# Patient Record
Sex: MALE | Race: WHITE | NOT HISPANIC OR LATINO | Employment: OTHER | URBAN - METROPOLITAN AREA
[De-identification: names, ages, dates, MRNs, and addresses within clinical notes are randomized per-mention and may not be internally consistent; named-entity substitution may affect disease eponyms.]

---

## 2019-05-17 ENCOUNTER — HISTORICAL (OUTPATIENT)
Dept: ADMINISTRATIVE | Facility: HOSPITAL | Age: 44
End: 2019-05-17

## 2020-05-15 ENCOUNTER — HISTORICAL (OUTPATIENT)
Dept: ADMINISTRATIVE | Facility: HOSPITAL | Age: 45
End: 2020-05-15

## 2020-05-19 ENCOUNTER — HISTORICAL (OUTPATIENT)
Dept: ADMINISTRATIVE | Facility: HOSPITAL | Age: 45
End: 2020-05-19

## 2020-06-03 ENCOUNTER — HISTORICAL (OUTPATIENT)
Dept: ADMINISTRATIVE | Facility: HOSPITAL | Age: 45
End: 2020-06-03

## 2020-06-08 ENCOUNTER — HISTORICAL (OUTPATIENT)
Dept: RADIOLOGY | Facility: HOSPITAL | Age: 45
End: 2020-06-08

## 2020-12-18 ENCOUNTER — HISTORICAL (OUTPATIENT)
Dept: ADMINISTRATIVE | Facility: HOSPITAL | Age: 45
End: 2020-12-18

## 2021-05-18 ENCOUNTER — HISTORICAL (OUTPATIENT)
Dept: ADMINISTRATIVE | Facility: HOSPITAL | Age: 46
End: 2021-05-18

## 2021-11-12 ENCOUNTER — HISTORICAL (OUTPATIENT)
Dept: ADMINISTRATIVE | Facility: HOSPITAL | Age: 46
End: 2021-11-12

## 2022-04-07 ENCOUNTER — HISTORICAL (OUTPATIENT)
Dept: ADMINISTRATIVE | Facility: HOSPITAL | Age: 47
End: 2022-04-07

## 2022-04-24 VITALS
BODY MASS INDEX: 30.01 KG/M2 | WEIGHT: 221.56 LBS | HEIGHT: 72 IN | SYSTOLIC BLOOD PRESSURE: 110 MMHG | OXYGEN SATURATION: 95 % | DIASTOLIC BLOOD PRESSURE: 78 MMHG

## 2022-05-16 ENCOUNTER — LAB VISIT (OUTPATIENT)
Dept: LAB | Facility: HOSPITAL | Age: 47
End: 2022-05-16
Attending: INTERNAL MEDICINE
Payer: COMMERCIAL

## 2022-05-16 DIAGNOSIS — K21.9 GASTROESOPHAGEAL REFLUX DISEASE, UNSPECIFIED WHETHER ESOPHAGITIS PRESENT: ICD-10-CM

## 2022-05-16 DIAGNOSIS — R53.83 FATIGUE, UNSPECIFIED TYPE: ICD-10-CM

## 2022-05-16 DIAGNOSIS — Z00.00 WELL ADULT EXAM: ICD-10-CM

## 2022-05-16 DIAGNOSIS — Z80.42 FAMILY HISTORY OF MALIGNANT NEOPLASM OF PROSTATE: Primary | ICD-10-CM

## 2022-05-16 DIAGNOSIS — R79.89 HYPOURICEMIA: ICD-10-CM

## 2022-05-16 LAB
ALBUMIN SERPL-MCNC: 4.3 GM/DL (ref 3.5–5)
ALBUMIN/GLOB SERPL: 1.4 RATIO (ref 1.1–2)
ALP SERPL-CCNC: 70 UNIT/L (ref 40–150)
ALT SERPL-CCNC: 24 UNIT/L (ref 0–55)
AST SERPL-CCNC: 19 UNIT/L (ref 5–34)
BASOPHILS # BLD AUTO: 0.04 X10(3)/MCL (ref 0–0.2)
BASOPHILS NFR BLD AUTO: 0.7 %
BILIRUBIN DIRECT+TOT PNL SERPL-MCNC: 0.7 MG/DL
BUN SERPL-MCNC: 16.5 MG/DL (ref 8.9–20.6)
CALCIUM SERPL-MCNC: 9.5 MG/DL (ref 8.4–10.2)
CHLORIDE SERPL-SCNC: 102 MMOL/L (ref 98–107)
CHOLEST SERPL-MCNC: 190 MG/DL
CHOLEST/HDLC SERPL: 4 {RATIO} (ref 0–5)
CO2 SERPL-SCNC: 28 MMOL/L (ref 22–29)
CREAT SERPL-MCNC: 1.18 MG/DL (ref 0.73–1.18)
EOSINOPHIL # BLD AUTO: 0.16 X10(3)/MCL (ref 0–0.9)
EOSINOPHIL NFR BLD AUTO: 2.7 %
ERYTHROCYTE [DISTWIDTH] IN BLOOD BY AUTOMATED COUNT: 12.9 % (ref 11.5–17)
GLOBULIN SER-MCNC: 3 GM/DL (ref 2.4–3.5)
GLUCOSE SERPL-MCNC: 82 MG/DL (ref 74–100)
HCT VFR BLD AUTO: 49.1 % (ref 42–52)
HDLC SERPL-MCNC: 54 MG/DL (ref 35–60)
HGB BLD-MCNC: 16.5 GM/DL (ref 14–18)
IMM GRANULOCYTES # BLD AUTO: 0.03 X10(3)/MCL (ref 0–0.02)
IMM GRANULOCYTES NFR BLD AUTO: 0.5 % (ref 0–0.43)
LDLC SERPL CALC-MCNC: 113 MG/DL (ref 50–140)
LYMPHOCYTES # BLD AUTO: 2 X10(3)/MCL (ref 0.6–4.6)
LYMPHOCYTES NFR BLD AUTO: 33.2 %
MCH RBC QN AUTO: 29.2 PG (ref 27–31)
MCHC RBC AUTO-ENTMCNC: 33.6 MG/DL (ref 33–36)
MCV RBC AUTO: 86.7 FL (ref 80–94)
MONOCYTES # BLD AUTO: 0.43 X10(3)/MCL (ref 0.1–1.3)
MONOCYTES NFR BLD AUTO: 7.1 %
NEUTROPHILS # BLD AUTO: 3.4 X10(3)/MCL (ref 2.1–9.2)
NEUTROPHILS NFR BLD AUTO: 55.8 %
NRBC BLD AUTO-RTO: 0 %
PLATELET # BLD AUTO: 243 X10(3)/MCL (ref 130–400)
PMV BLD AUTO: 10.1 FL (ref 9.4–12.4)
POTASSIUM SERPL-SCNC: 4.7 MMOL/L (ref 3.5–5.1)
PROT SERPL-MCNC: 7.3 GM/DL (ref 6.4–8.3)
PSA SERPL-MCNC: 0.75 NG/ML
RBC # BLD AUTO: 5.66 X10(6)/MCL (ref 4.7–6.1)
SODIUM SERPL-SCNC: 137 MMOL/L (ref 136–145)
TESTOST SERPL-MCNC: 259.43 NG/DL (ref 240.24–870.68)
TRIGL SERPL-MCNC: 114 MG/DL (ref 34–140)
VLDLC SERPL CALC-MCNC: 23 MG/DL
WBC # SPEC AUTO: 6 X10(3)/MCL (ref 4.5–11.5)

## 2022-05-16 PROCEDURE — 80061 LIPID PANEL: CPT

## 2022-05-16 PROCEDURE — 84403 ASSAY OF TOTAL TESTOSTERONE: CPT

## 2022-05-16 PROCEDURE — 84153 ASSAY OF PSA TOTAL: CPT

## 2022-05-16 PROCEDURE — 36415 COLL VENOUS BLD VENIPUNCTURE: CPT

## 2022-05-16 PROCEDURE — 80053 COMPREHEN METABOLIC PANEL: CPT

## 2022-05-16 PROCEDURE — 85025 COMPLETE CBC W/AUTO DIFF WBC: CPT

## 2022-05-17 ENCOUNTER — OFFICE VISIT (OUTPATIENT)
Dept: INTERNAL MEDICINE | Facility: CLINIC | Age: 47
End: 2022-05-17
Payer: COMMERCIAL

## 2022-05-17 VITALS
HEIGHT: 73 IN | DIASTOLIC BLOOD PRESSURE: 64 MMHG | RESPIRATION RATE: 18 BRPM | BODY MASS INDEX: 29.29 KG/M2 | OXYGEN SATURATION: 96 % | SYSTOLIC BLOOD PRESSURE: 120 MMHG | WEIGHT: 221 LBS | TEMPERATURE: 98 F | HEART RATE: 77 BPM

## 2022-05-17 DIAGNOSIS — K21.9 GASTROESOPHAGEAL REFLUX DISEASE, UNSPECIFIED WHETHER ESOPHAGITIS PRESENT: ICD-10-CM

## 2022-05-17 DIAGNOSIS — R79.89 DECREASED TESTOSTERONE LEVEL: ICD-10-CM

## 2022-05-17 DIAGNOSIS — Z00.00 WELLNESS EXAMINATION: Primary | ICD-10-CM

## 2022-05-17 PROBLEM — Z80.42 FAMILY HISTORY OF PROSTATE CANCER: Status: ACTIVE | Noted: 2022-05-17

## 2022-05-17 PROCEDURE — 1159F MED LIST DOCD IN RCRD: CPT | Mod: CPTII,,, | Performed by: INTERNAL MEDICINE

## 2022-05-17 PROCEDURE — 99396 PR PREVENTIVE VISIT,EST,40-64: ICD-10-PCS | Mod: ,,, | Performed by: INTERNAL MEDICINE

## 2022-05-17 PROCEDURE — 3008F PR BODY MASS INDEX (BMI) DOCUMENTED: ICD-10-PCS | Mod: CPTII,,, | Performed by: INTERNAL MEDICINE

## 2022-05-17 PROCEDURE — 99396 PREV VISIT EST AGE 40-64: CPT | Mod: ,,, | Performed by: INTERNAL MEDICINE

## 2022-05-17 PROCEDURE — 3078F DIAST BP <80 MM HG: CPT | Mod: CPTII,,, | Performed by: INTERNAL MEDICINE

## 2022-05-17 PROCEDURE — 3074F PR MOST RECENT SYSTOLIC BLOOD PRESSURE < 130 MM HG: ICD-10-PCS | Mod: CPTII,,, | Performed by: INTERNAL MEDICINE

## 2022-05-17 PROCEDURE — 3078F PR MOST RECENT DIASTOLIC BLOOD PRESSURE < 80 MM HG: ICD-10-PCS | Mod: CPTII,,, | Performed by: INTERNAL MEDICINE

## 2022-05-17 PROCEDURE — 3008F BODY MASS INDEX DOCD: CPT | Mod: CPTII,,, | Performed by: INTERNAL MEDICINE

## 2022-05-17 PROCEDURE — 3074F SYST BP LT 130 MM HG: CPT | Mod: CPTII,,, | Performed by: INTERNAL MEDICINE

## 2022-05-17 PROCEDURE — 1159F PR MEDICATION LIST DOCUMENTED IN MEDICAL RECORD: ICD-10-PCS | Mod: CPTII,,, | Performed by: INTERNAL MEDICINE

## 2022-05-17 RX ORDER — TESTOSTERONE CYPIONATE 1000 MG/10ML
100 INJECTION, SOLUTION INTRAMUSCULAR
Qty: 10 ML | Refills: 2 | Status: SHIPPED | OUTPATIENT
Start: 2022-05-17 | End: 2022-05-17 | Stop reason: SDUPTHER

## 2022-05-17 RX ORDER — TESTOSTERONE CYPIONATE 1000 MG/10ML
INJECTION, SOLUTION INTRAMUSCULAR
COMMUNITY
Start: 2022-02-06 | End: 2022-05-17 | Stop reason: SDUPTHER

## 2022-05-17 RX ORDER — SILDENAFIL 100 MG/1
1 TABLET, FILM COATED ORAL
COMMUNITY
Start: 2022-02-06 | End: 2022-05-17 | Stop reason: SDUPTHER

## 2022-05-17 RX ORDER — SILDENAFIL 100 MG/1
100 TABLET, FILM COATED ORAL
Qty: 9 TABLET | Refills: 11 | Status: SHIPPED | OUTPATIENT
Start: 2022-05-17 | End: 2022-08-08

## 2022-05-17 RX ORDER — TESTOSTERONE CYPIONATE 1000 MG/10ML
100 INJECTION, SOLUTION INTRAMUSCULAR
Qty: 10 ML | Refills: 2 | Status: SHIPPED | OUTPATIENT
Start: 2022-05-17 | End: 2022-06-29

## 2022-05-17 NOTE — PROGRESS NOTES
Subjective:       Patient ID: Larry Eli is a 46 y.o. male.    Chief Complaint: Follow-up (F/u mmp)    The patient is a 46-year-old man in for wellness check.  He feels well overall.  No new complaints.  He admits he is not exercising but is trying to improve his diet.    Follow-up      Review of Systems      Objective:      Physical Exam  Vitals reviewed.   Constitutional:       Appearance: Normal appearance.   HENT:      Head: Normocephalic and atraumatic.      Right Ear: Tympanic membrane normal.      Left Ear: Tympanic membrane normal.      Mouth/Throat:      Pharynx: Oropharynx is clear.   Eyes:      Pupils: Pupils are equal, round, and reactive to light.   Neck:      Vascular: No carotid bruit.   Cardiovascular:      Rate and Rhythm: Normal rate and regular rhythm.      Pulses: Normal pulses.      Heart sounds: Normal heart sounds.   Pulmonary:      Effort: Pulmonary effort is normal.      Breath sounds: Normal breath sounds.   Abdominal:      General: Abdomen is flat.      Palpations: Abdomen is soft. There is no mass.      Tenderness: There is no abdominal tenderness. There is no guarding.   Genitourinary:     Comments: Rectal exam reveals normal tone.  Prostate medium size with some asymmetry with the right lobe being a bit larger than the left.  No nodularity.  No masses.  Heme-negative stool.  Musculoskeletal:         General: No swelling.      Cervical back: Neck supple.   Lymphadenopathy:      Cervical: No cervical adenopathy.   Skin:     General: Skin is warm and dry.   Neurological:      General: No focal deficit present.      Mental Status: He is alert and oriented to person, place, and time.       lab work reviewed.  Numbers were fine including PSA  Assessment:       Problem List Items Addressed This Visit        Endocrine    Decreased testosterone level    Relevant Orders    CBC Auto Differential    Comprehensive Metabolic Panel    Lipid Panel    Testosterone       GI    Gastroesophageal  reflux disease    Relevant Orders    CBC Auto Differential    Comprehensive Metabolic Panel    Lipid Panel    Testosterone       Other    Wellness examination - Primary    Relevant Orders    CBC Auto Differential    Comprehensive Metabolic Panel    Lipid Panel    Testosterone          Plan:     1. Wellness    2. Strong family history of prostate cancer.    3. 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 History of GERD.  Stable    5. Obesity.    Continue same meds TLC etc..  Encourage weight reduction.  Encourage routine exercise.  Follow-up 6 months with CBC CMP lipid testosterone prior

## 2022-05-23 ENCOUNTER — PATIENT MESSAGE (OUTPATIENT)
Dept: ADMINISTRATIVE | Facility: HOSPITAL | Age: 47
End: 2022-05-23
Payer: COMMERCIAL

## 2022-08-22 PROBLEM — Z00.00 WELLNESS EXAMINATION: Status: RESOLVED | Noted: 2022-05-17 | Resolved: 2022-08-22

## 2022-08-26 ENCOUNTER — PATIENT MESSAGE (OUTPATIENT)
Dept: ADMINISTRATIVE | Facility: HOSPITAL | Age: 47
End: 2022-08-26
Payer: COMMERCIAL

## 2022-08-26 DIAGNOSIS — Z12.11 SCREENING FOR COLON CANCER: ICD-10-CM

## 2022-10-03 ENCOUNTER — PATIENT MESSAGE (OUTPATIENT)
Dept: ADMINISTRATIVE | Facility: HOSPITAL | Age: 47
End: 2022-10-03
Payer: COMMERCIAL

## 2022-11-10 ENCOUNTER — TELEPHONE (OUTPATIENT)
Dept: INTERNAL MEDICINE | Facility: CLINIC | Age: 47
End: 2022-11-10
Payer: COMMERCIAL

## 2022-11-10 NOTE — TELEPHONE ENCOUNTER
----- Message from Jennifer Betts LPN sent at 11/9/2022  4:20 PM CST -----  Regarding: leelee Nguyen 11/17 @2:00  Fasting labs needed.

## 2022-11-15 ENCOUNTER — LAB VISIT (OUTPATIENT)
Dept: LAB | Facility: HOSPITAL | Age: 47
End: 2022-11-15
Attending: INTERNAL MEDICINE
Payer: COMMERCIAL

## 2022-11-15 DIAGNOSIS — K21.9 GASTROESOPHAGEAL REFLUX DISEASE, UNSPECIFIED WHETHER ESOPHAGITIS PRESENT: ICD-10-CM

## 2022-11-15 DIAGNOSIS — R79.89 DECREASED TESTOSTERONE LEVEL: ICD-10-CM

## 2022-11-15 DIAGNOSIS — Z00.00 WELLNESS EXAMINATION: ICD-10-CM

## 2022-11-15 LAB
ALBUMIN SERPL-MCNC: 4.3 GM/DL (ref 3.5–5)
ALBUMIN/GLOB SERPL: 1.4 RATIO (ref 1.1–2)
ALP SERPL-CCNC: 74 UNIT/L (ref 40–150)
ALT SERPL-CCNC: 25 UNIT/L (ref 0–55)
AST SERPL-CCNC: 21 UNIT/L (ref 5–34)
BASOPHILS # BLD AUTO: 0.04 X10(3)/MCL (ref 0–0.2)
BASOPHILS NFR BLD AUTO: 0.6 %
BILIRUBIN DIRECT+TOT PNL SERPL-MCNC: 0.7 MG/DL
BUN SERPL-MCNC: 14.7 MG/DL (ref 8.9–20.6)
CALCIUM SERPL-MCNC: 9.7 MG/DL (ref 8.4–10.2)
CHLORIDE SERPL-SCNC: 101 MMOL/L (ref 98–107)
CHOLEST SERPL-MCNC: 191 MG/DL
CHOLEST/HDLC SERPL: 4 {RATIO} (ref 0–5)
CO2 SERPL-SCNC: 27 MMOL/L (ref 22–29)
CREAT SERPL-MCNC: 1.01 MG/DL (ref 0.73–1.18)
EOSINOPHIL # BLD AUTO: 0.19 X10(3)/MCL (ref 0–0.9)
EOSINOPHIL NFR BLD AUTO: 3 %
ERYTHROCYTE [DISTWIDTH] IN BLOOD BY AUTOMATED COUNT: 12.6 % (ref 11.5–17)
GFR SERPLBLD CREATININE-BSD FMLA CKD-EPI: >60 MLS/MIN/1.73/M2
GLOBULIN SER-MCNC: 3 GM/DL (ref 2.4–3.5)
GLUCOSE SERPL-MCNC: 89 MG/DL (ref 74–100)
HCT VFR BLD AUTO: 49.3 % (ref 42–52)
HDLC SERPL-MCNC: 53 MG/DL (ref 35–60)
HGB BLD-MCNC: 16.5 GM/DL (ref 14–18)
IMM GRANULOCYTES # BLD AUTO: 0.01 X10(3)/MCL (ref 0–0.04)
IMM GRANULOCYTES NFR BLD AUTO: 0.2 %
LDLC SERPL CALC-MCNC: 120 MG/DL (ref 50–140)
LYMPHOCYTES # BLD AUTO: 2.14 X10(3)/MCL (ref 0.6–4.6)
LYMPHOCYTES NFR BLD AUTO: 33.3 %
MCH RBC QN AUTO: 29.5 PG (ref 27–31)
MCHC RBC AUTO-ENTMCNC: 33.5 MG/DL (ref 33–36)
MCV RBC AUTO: 88.2 FL (ref 80–94)
MONOCYTES # BLD AUTO: 0.55 X10(3)/MCL (ref 0.1–1.3)
MONOCYTES NFR BLD AUTO: 8.6 %
NEUTROPHILS # BLD AUTO: 3.5 X10(3)/MCL (ref 2.1–9.2)
NEUTROPHILS NFR BLD AUTO: 54.3 %
NRBC BLD AUTO-RTO: 0 %
PLATELET # BLD AUTO: 246 X10(3)/MCL (ref 130–400)
PMV BLD AUTO: 10 FL (ref 7.4–10.4)
POTASSIUM SERPL-SCNC: 4.4 MMOL/L (ref 3.5–5.1)
PROT SERPL-MCNC: 7.3 GM/DL (ref 6.4–8.3)
RBC # BLD AUTO: 5.59 X10(6)/MCL (ref 4.7–6.1)
SODIUM SERPL-SCNC: 135 MMOL/L (ref 136–145)
TESTOST SERPL-MCNC: 293.07 NG/DL (ref 240.24–870.68)
TRIGL SERPL-MCNC: 92 MG/DL (ref 34–140)
VLDLC SERPL CALC-MCNC: 18 MG/DL
WBC # SPEC AUTO: 6.4 X10(3)/MCL (ref 4.5–11.5)

## 2022-11-15 PROCEDURE — 84403 ASSAY OF TOTAL TESTOSTERONE: CPT

## 2022-11-15 PROCEDURE — 80053 COMPREHEN METABOLIC PANEL: CPT

## 2022-11-15 PROCEDURE — 36415 COLL VENOUS BLD VENIPUNCTURE: CPT

## 2022-11-15 PROCEDURE — 80061 LIPID PANEL: CPT

## 2022-11-15 PROCEDURE — 85025 COMPLETE CBC W/AUTO DIFF WBC: CPT

## 2022-11-17 ENCOUNTER — OFFICE VISIT (OUTPATIENT)
Dept: INTERNAL MEDICINE | Facility: CLINIC | Age: 47
End: 2022-11-17
Payer: COMMERCIAL

## 2022-11-17 VITALS
RESPIRATION RATE: 18 BRPM | SYSTOLIC BLOOD PRESSURE: 128 MMHG | HEIGHT: 73 IN | HEART RATE: 80 BPM | WEIGHT: 225 LBS | DIASTOLIC BLOOD PRESSURE: 70 MMHG | BODY MASS INDEX: 29.82 KG/M2 | TEMPERATURE: 98 F | OXYGEN SATURATION: 97 %

## 2022-11-17 DIAGNOSIS — Z80.42 FAMILY HISTORY OF PROSTATE CANCER: ICD-10-CM

## 2022-11-17 DIAGNOSIS — Z00.00 WELLNESS EXAMINATION: ICD-10-CM

## 2022-11-17 DIAGNOSIS — K21.9 GASTROESOPHAGEAL REFLUX DISEASE, UNSPECIFIED WHETHER ESOPHAGITIS PRESENT: ICD-10-CM

## 2022-11-17 DIAGNOSIS — R79.89 DECREASED TESTOSTERONE LEVEL: Primary | ICD-10-CM

## 2022-11-17 PROCEDURE — 3078F DIAST BP <80 MM HG: CPT | Mod: CPTII,,, | Performed by: INTERNAL MEDICINE

## 2022-11-17 PROCEDURE — 99213 PR OFFICE/OUTPT VISIT, EST, LEVL III, 20-29 MIN: ICD-10-PCS | Mod: ,,, | Performed by: INTERNAL MEDICINE

## 2022-11-17 PROCEDURE — 1159F PR MEDICATION LIST DOCUMENTED IN MEDICAL RECORD: ICD-10-PCS | Mod: CPTII,,, | Performed by: INTERNAL MEDICINE

## 2022-11-17 PROCEDURE — 3008F PR BODY MASS INDEX (BMI) DOCUMENTED: ICD-10-PCS | Mod: CPTII,,, | Performed by: INTERNAL MEDICINE

## 2022-11-17 PROCEDURE — 1159F MED LIST DOCD IN RCRD: CPT | Mod: CPTII,,, | Performed by: INTERNAL MEDICINE

## 2022-11-17 PROCEDURE — 3074F SYST BP LT 130 MM HG: CPT | Mod: CPTII,,, | Performed by: INTERNAL MEDICINE

## 2022-11-17 PROCEDURE — 99213 OFFICE O/P EST LOW 20 MIN: CPT | Mod: ,,, | Performed by: INTERNAL MEDICINE

## 2022-11-17 PROCEDURE — 3074F PR MOST RECENT SYSTOLIC BLOOD PRESSURE < 130 MM HG: ICD-10-PCS | Mod: CPTII,,, | Performed by: INTERNAL MEDICINE

## 2022-11-17 PROCEDURE — 3008F BODY MASS INDEX DOCD: CPT | Mod: CPTII,,, | Performed by: INTERNAL MEDICINE

## 2022-11-17 PROCEDURE — 3078F PR MOST RECENT DIASTOLIC BLOOD PRESSURE < 80 MM HG: ICD-10-PCS | Mod: CPTII,,, | Performed by: INTERNAL MEDICINE

## 2022-11-17 RX ORDER — SILDENAFIL 100 MG/1
100 TABLET, FILM COATED ORAL
Qty: 10 TABLET | Refills: 11 | Status: SHIPPED | OUTPATIENT
Start: 2022-11-17 | End: 2023-11-17

## 2022-11-17 NOTE — PROGRESS NOTES
"Subjective:       Patient ID: Larry Eli is a 46 y.o. male.    Chief Complaint: Follow-up (6 mo-discuss labs 11/15)      The patient is a 46-year-old man in for follow-up of multiple issues.  He feels fine overall.  No side effects from meds.  His testosterone level was done on day 13 of his prior injection.    Follow-up    Review of Systems     Current Outpatient Medications on File Prior to Visit   Medication Sig Dispense Refill    sildenafiL (VIAGRA) 100 MG tablet TRY 1 HALF TABLET BY MOUTH DAILY FOR FIRST 2 DOSES, THEN 1 TABLET DAILY AS NEEDED FOR ERECTILE DYSFUCTION 10 tablet 11    testosterone cypionate (DEPOTESTOTERONE CYPIONATE) 100 mg/mL injection INJECT 1ML INTO THE MUSCLE EVERY 2 WEEKS 10 mL 1     No current facility-administered medications on file prior to visit.     Objective:      /70 (BP Location: Right arm, Patient Position: Sitting)   Pulse 80   Temp 98.3 °F (36.8 °C)   Resp 18   Ht 6' 0.99" (1.854 m)   Wt 102.1 kg (225 lb)   SpO2 97%   BMI 29.69 kg/m²     Physical Exam  Vitals reviewed.   Constitutional:       Appearance: Normal appearance.   HENT:      Head: Normocephalic and atraumatic.      Mouth/Throat:      Pharynx: Oropharynx is clear.   Eyes:      Pupils: Pupils are equal, round, and reactive to light.   Cardiovascular:      Rate and Rhythm: Normal rate and regular rhythm.      Pulses: Normal pulses.      Heart sounds: Normal heart sounds.   Pulmonary:      Breath sounds: Normal breath sounds.   Abdominal:      General: Abdomen is flat.      Palpations: Abdomen is soft.   Musculoskeletal:      Cervical back: Neck supple.   Skin:     General: Skin is warm and dry.   Neurological:      Mental Status: He is alert.     Lab work reviewed  Assessment:       1. Decreased testosterone level    2. Gastroesophageal reflux disease, unspecified whether esophagitis present    3. Family history of prostate cancer    4. Wellness examination        1. Low T syndrome.  On replacement " hormone with good results.  No obvious toxicity    2. GERD.  Stable     3. Family history of prostate CA.  Plan:           Continue same meds.  New prescription sent to pharmacy.  Follow-up with me 6 months annual checkup with CBC CMP lipid TSH PSA testosterone level prior

## 2023-01-30 ENCOUNTER — PATIENT MESSAGE (OUTPATIENT)
Dept: ADMINISTRATIVE | Facility: HOSPITAL | Age: 48
End: 2023-01-30
Payer: COMMERCIAL

## 2023-05-01 ENCOUNTER — PATIENT MESSAGE (OUTPATIENT)
Dept: ADMINISTRATIVE | Facility: HOSPITAL | Age: 48
End: 2023-05-01
Payer: COMMERCIAL

## 2023-05-11 ENCOUNTER — TELEPHONE (OUTPATIENT)
Dept: INTERNAL MEDICINE | Facility: CLINIC | Age: 48
End: 2023-05-11
Payer: COMMERCIAL

## 2023-05-11 NOTE — TELEPHONE ENCOUNTER
----- Message from Sarah Adam MA sent at 5/11/2023  9:44 AM CDT -----  Regarding: PV 05/17/23  @2  Fasting labs needed

## 2023-05-12 ENCOUNTER — LAB VISIT (OUTPATIENT)
Dept: LAB | Facility: HOSPITAL | Age: 48
End: 2023-05-12
Attending: INTERNAL MEDICINE
Payer: COMMERCIAL

## 2023-05-12 DIAGNOSIS — R79.89 DECREASED TESTOSTERONE LEVEL: ICD-10-CM

## 2023-05-12 DIAGNOSIS — Z80.42 FAMILY HISTORY OF PROSTATE CANCER: ICD-10-CM

## 2023-05-12 DIAGNOSIS — Z00.00 WELLNESS EXAMINATION: ICD-10-CM

## 2023-05-12 DIAGNOSIS — K21.9 GASTROESOPHAGEAL REFLUX DISEASE, UNSPECIFIED WHETHER ESOPHAGITIS PRESENT: ICD-10-CM

## 2023-05-12 LAB
ALBUMIN SERPL-MCNC: 4.1 G/DL (ref 3.5–5)
ALBUMIN/GLOB SERPL: 1.4 RATIO (ref 1.1–2)
ALP SERPL-CCNC: 67 UNIT/L (ref 40–150)
ALT SERPL-CCNC: 22 UNIT/L (ref 0–55)
AST SERPL-CCNC: 19 UNIT/L (ref 5–34)
BASOPHILS # BLD AUTO: 0.03 X10(3)/MCL
BASOPHILS NFR BLD AUTO: 0.5 %
BILIRUBIN DIRECT+TOT PNL SERPL-MCNC: 0.6 MG/DL
BUN SERPL-MCNC: 14.1 MG/DL (ref 8.9–20.6)
CALCIUM SERPL-MCNC: 9.3 MG/DL (ref 8.4–10.2)
CHLORIDE SERPL-SCNC: 104 MMOL/L (ref 98–107)
CHOLEST SERPL-MCNC: 184 MG/DL
CHOLEST/HDLC SERPL: 4 {RATIO} (ref 0–5)
CO2 SERPL-SCNC: 27 MMOL/L (ref 22–29)
CREAT SERPL-MCNC: 1.16 MG/DL (ref 0.73–1.18)
EOSINOPHIL # BLD AUTO: 0.16 X10(3)/MCL (ref 0–0.9)
EOSINOPHIL NFR BLD AUTO: 2.5 %
ERYTHROCYTE [DISTWIDTH] IN BLOOD BY AUTOMATED COUNT: 12.4 % (ref 11.5–17)
EST. AVERAGE GLUCOSE BLD GHB EST-MCNC: 102.5 MG/DL
GFR SERPLBLD CREATININE-BSD FMLA CKD-EPI: >60 MLS/MIN/1.73/M2
GLOBULIN SER-MCNC: 2.9 GM/DL (ref 2.4–3.5)
GLUCOSE SERPL-MCNC: 94 MG/DL (ref 74–100)
HBA1C MFR BLD: 5.2 %
HCT VFR BLD AUTO: 48.9 % (ref 42–52)
HDLC SERPL-MCNC: 43 MG/DL (ref 35–60)
HGB BLD-MCNC: 16.4 G/DL (ref 14–18)
IMM GRANULOCYTES # BLD AUTO: 0.03 X10(3)/MCL (ref 0–0.04)
IMM GRANULOCYTES NFR BLD AUTO: 0.5 %
LDLC SERPL CALC-MCNC: 125 MG/DL (ref 50–140)
LYMPHOCYTES # BLD AUTO: 2.07 X10(3)/MCL (ref 0.6–4.6)
LYMPHOCYTES NFR BLD AUTO: 32.5 %
MCH RBC QN AUTO: 29.4 PG (ref 27–31)
MCHC RBC AUTO-ENTMCNC: 33.5 G/DL (ref 33–36)
MCV RBC AUTO: 87.8 FL (ref 80–94)
MONOCYTES # BLD AUTO: 0.49 X10(3)/MCL (ref 0.1–1.3)
MONOCYTES NFR BLD AUTO: 7.7 %
NEUTROPHILS # BLD AUTO: 3.59 X10(3)/MCL (ref 2.1–9.2)
NEUTROPHILS NFR BLD AUTO: 56.3 %
NRBC BLD AUTO-RTO: 0 %
PLATELET # BLD AUTO: 231 X10(3)/MCL (ref 130–400)
PMV BLD AUTO: 9.9 FL (ref 7.4–10.4)
POTASSIUM SERPL-SCNC: 4.5 MMOL/L (ref 3.5–5.1)
PROT SERPL-MCNC: 7 GM/DL (ref 6.4–8.3)
PSA SERPL-MCNC: 0.69 NG/ML
RBC # BLD AUTO: 5.57 X10(6)/MCL (ref 4.7–6.1)
SODIUM SERPL-SCNC: 138 MMOL/L (ref 136–145)
TESTOST SERPL-MCNC: 375.65 NG/DL (ref 240.24–870.68)
TRIGL SERPL-MCNC: 82 MG/DL (ref 34–140)
TSH SERPL-ACNC: 1.3 UIU/ML (ref 0.35–4.94)
VLDLC SERPL CALC-MCNC: 16 MG/DL
WBC # SPEC AUTO: 6.37 X10(3)/MCL (ref 4.5–11.5)

## 2023-05-12 PROCEDURE — 83036 HEMOGLOBIN GLYCOSYLATED A1C: CPT

## 2023-05-12 PROCEDURE — 80053 COMPREHEN METABOLIC PANEL: CPT

## 2023-05-12 PROCEDURE — 36415 COLL VENOUS BLD VENIPUNCTURE: CPT

## 2023-05-12 PROCEDURE — 80061 LIPID PANEL: CPT

## 2023-05-12 PROCEDURE — 85025 COMPLETE CBC W/AUTO DIFF WBC: CPT

## 2023-05-12 PROCEDURE — 84153 ASSAY OF PSA TOTAL: CPT

## 2023-05-12 PROCEDURE — 84443 ASSAY THYROID STIM HORMONE: CPT

## 2023-05-12 PROCEDURE — 84403 ASSAY OF TOTAL TESTOSTERONE: CPT

## 2023-05-17 ENCOUNTER — OFFICE VISIT (OUTPATIENT)
Dept: INTERNAL MEDICINE | Facility: CLINIC | Age: 48
End: 2023-05-17
Payer: COMMERCIAL

## 2023-05-17 VITALS
OXYGEN SATURATION: 95 % | HEIGHT: 73 IN | HEART RATE: 89 BPM | WEIGHT: 231 LBS | DIASTOLIC BLOOD PRESSURE: 68 MMHG | SYSTOLIC BLOOD PRESSURE: 130 MMHG | BODY MASS INDEX: 30.62 KG/M2

## 2023-05-17 DIAGNOSIS — Z00.00 WELLNESS EXAMINATION: Primary | ICD-10-CM

## 2023-05-17 DIAGNOSIS — K21.9 GASTROESOPHAGEAL REFLUX DISEASE, UNSPECIFIED WHETHER ESOPHAGITIS PRESENT: ICD-10-CM

## 2023-05-17 DIAGNOSIS — Z80.42 FAMILY HISTORY OF PROSTATE CANCER: ICD-10-CM

## 2023-05-17 DIAGNOSIS — R79.89 DECREASED TESTOSTERONE LEVEL: ICD-10-CM

## 2023-05-17 PROCEDURE — 1159F PR MEDICATION LIST DOCUMENTED IN MEDICAL RECORD: ICD-10-PCS | Mod: CPTII,,, | Performed by: INTERNAL MEDICINE

## 2023-05-17 PROCEDURE — 3075F PR MOST RECENT SYSTOLIC BLOOD PRESS GE 130-139MM HG: ICD-10-PCS | Mod: CPTII,,, | Performed by: INTERNAL MEDICINE

## 2023-05-17 PROCEDURE — 99396 PREV VISIT EST AGE 40-64: CPT | Mod: ,,, | Performed by: INTERNAL MEDICINE

## 2023-05-17 PROCEDURE — 3008F PR BODY MASS INDEX (BMI) DOCUMENTED: ICD-10-PCS | Mod: CPTII,,, | Performed by: INTERNAL MEDICINE

## 2023-05-17 PROCEDURE — 3078F DIAST BP <80 MM HG: CPT | Mod: CPTII,,, | Performed by: INTERNAL MEDICINE

## 2023-05-17 PROCEDURE — 3075F SYST BP GE 130 - 139MM HG: CPT | Mod: CPTII,,, | Performed by: INTERNAL MEDICINE

## 2023-05-17 PROCEDURE — 1159F MED LIST DOCD IN RCRD: CPT | Mod: CPTII,,, | Performed by: INTERNAL MEDICINE

## 2023-05-17 PROCEDURE — 3078F PR MOST RECENT DIASTOLIC BLOOD PRESSURE < 80 MM HG: ICD-10-PCS | Mod: CPTII,,, | Performed by: INTERNAL MEDICINE

## 2023-05-17 PROCEDURE — 99396 PR PREVENTIVE VISIT,EST,40-64: ICD-10-PCS | Mod: ,,, | Performed by: INTERNAL MEDICINE

## 2023-05-17 PROCEDURE — 3008F BODY MASS INDEX DOCD: CPT | Mod: CPTII,,, | Performed by: INTERNAL MEDICINE

## 2023-05-17 NOTE — PROGRESS NOTES
"Subjective:       Patient ID: Larry Eli is a 47 y.o. male.    Chief Complaint: Follow-up (No concerns 05/12)      Patient is a 47-year-old man in for wellness check.  He feels well overall.  He has had some vague pain in the left upper quadrant.  For the past 10 days or so.  No rash noted but did warn him to look out for same and call me if it developed.    He has not done the Cologuard test.  We discussed colonoscopy and now he is amenable to that.    Follow-up    Review of Systems     Current Outpatient Medications on File Prior to Visit   Medication Sig Dispense Refill    sildenafiL (VIAGRA) 100 MG tablet TRY 1 HALF TABLET BY MOUTH DAILY FOR FIRST 2 DOSES, THEN 1 TABLET DAILY AS NEEDED FOR ERECTILE DYSFUCTION 10 tablet 11    sildenafiL (VIAGRA) 100 MG tablet Take 1 tablet (100 mg total) by mouth as needed for Erectile Dysfunction. 10 tablet 11    testosterone cypionate (DEPOTESTOTERONE CYPIONATE) 100 mg/mL injection INJECT 1ML INTO THE MUSCLE EVERY 2 WEEKS 10 mL 1     No current facility-administered medications on file prior to visit.     Objective:      /68   Pulse 89   Ht 6' 0.99" (1.854 m)   Wt 104.8 kg (231 lb)   SpO2 95%   BMI 30.49 kg/m²     Physical Exam  Vitals reviewed.   Constitutional:       Appearance: Normal appearance.   HENT:      Head: Normocephalic and atraumatic.      Right Ear: Tympanic membrane normal.      Left Ear: Tympanic membrane normal.      Mouth/Throat:      Pharynx: Oropharynx is clear.   Eyes:      Pupils: Pupils are equal, round, and reactive to light.   Neck:      Vascular: No carotid bruit.   Cardiovascular:      Rate and Rhythm: Normal rate and regular rhythm.      Pulses: Normal pulses.      Heart sounds: Normal heart sounds.   Pulmonary:      Effort: Pulmonary effort is normal.      Breath sounds: Normal breath sounds.   Abdominal:      General: Abdomen is flat.      Palpations: Abdomen is soft. There is no mass.      Tenderness: There is no abdominal " tenderness. There is no guarding.   Genitourinary:     Comments: Rectal exam revealed normal tone and no mass.  Prostate medium-sized smooth without nodules.  Heme-negative mucous.  Musculoskeletal:         General: No swelling.      Cervical back: Neck supple.   Lymphadenopathy:      Cervical: No cervical adenopathy.   Skin:     General: Skin is warm and dry.   Neurological:      General: No focal deficit present.      Mental Status: He is alert and oriented to person, place, and time.     Laboratory studies reviewed  Assessment:       1. Wellness examination    2. Decreased testosterone level    3. Gastroesophageal reflux disease, unspecified whether esophagitis present    4. Family history of prostate cancer      1. Wellness    2. Low T level.  Corrected with injections     3. GERD.  Stable     4. Worsening lipid profile.  Still at goal but trending in the wrong direction    5. Strong family history prostate cancer.  No evidence of him having it  Plan:     Increased TLC.  Encourage weight loss better dieting more exercise.      Follow-up 6 months with CBC CMP lipid PSA testosterone level prior.

## 2023-05-30 ENCOUNTER — TELEPHONE (OUTPATIENT)
Dept: INTERNAL MEDICINE | Facility: CLINIC | Age: 48
End: 2023-05-30
Payer: COMMERCIAL

## 2023-05-30 RX ORDER — TESTOSTERONE CYPIONATE 1000 MG/10ML
INJECTION, SOLUTION INTRAMUSCULAR
Qty: 10 ML | Refills: 1 | Status: CANCELLED | OUTPATIENT
Start: 2023-05-30

## 2023-05-30 NOTE — TELEPHONE ENCOUNTER
----- Message from [facility] sent at 5/30/2023  1:08 PM CDT -----  Regarding: refill  .Type:  RX Refill Request    Who Called:  pt    Refill or New Rx: testosterone cypionate (DEPOTESTOTERONE CYPIONATE) 100 mg/mL injection    Preferred Pharmacy with phone number: [facility] on [id number]    Local or Mail Order: local    Ordering Provider: Harsh    Would the patient rather a call back? Yes    Best Call Back Number: [phone]    Additional Information:  refill

## 2023-05-31 RX ORDER — TESTOSTERONE CYPIONATE 1000 MG/10ML
100 INJECTION, SOLUTION INTRAMUSCULAR
Qty: 10 ML | Refills: 1 | Status: SHIPPED | OUTPATIENT
Start: 2023-05-31 | End: 2023-11-21 | Stop reason: SDUPTHER

## 2023-07-24 ENCOUNTER — PATIENT MESSAGE (OUTPATIENT)
Dept: ADMINISTRATIVE | Facility: HOSPITAL | Age: 48
End: 2023-07-24
Payer: COMMERCIAL

## 2023-08-14 DIAGNOSIS — R79.89 DECREASED TESTOSTERONE LEVEL: Primary | ICD-10-CM

## 2023-08-15 RX ORDER — SILDENAFIL 100 MG/1
TABLET, FILM COATED ORAL
Qty: 10 TABLET | Refills: 11 | Status: SHIPPED | OUTPATIENT
Start: 2023-08-15 | End: 2023-11-21 | Stop reason: SDUPTHER

## 2023-10-25 ENCOUNTER — PATIENT MESSAGE (OUTPATIENT)
Dept: ADMINISTRATIVE | Facility: HOSPITAL | Age: 48
End: 2023-10-25
Payer: COMMERCIAL

## 2023-11-14 ENCOUNTER — TELEPHONE (OUTPATIENT)
Dept: INTERNAL MEDICINE | Facility: CLINIC | Age: 48
End: 2023-11-14
Payer: COMMERCIAL

## 2023-11-14 NOTE — TELEPHONE ENCOUNTER
----- Message from Jennifer Betts LPN sent at 11/13/2023  1:54 PM CST -----  Regarding: leelee Nguyen 11/21 @2:20  Fasting labs needed.

## 2023-11-15 ENCOUNTER — LAB VISIT (OUTPATIENT)
Dept: LAB | Facility: HOSPITAL | Age: 48
End: 2023-11-15
Attending: INTERNAL MEDICINE
Payer: COMMERCIAL

## 2023-11-15 DIAGNOSIS — Z00.00 WELLNESS EXAMINATION: ICD-10-CM

## 2023-11-15 DIAGNOSIS — K21.9 GASTROESOPHAGEAL REFLUX DISEASE, UNSPECIFIED WHETHER ESOPHAGITIS PRESENT: ICD-10-CM

## 2023-11-15 DIAGNOSIS — R79.89 DECREASED TESTOSTERONE LEVEL: ICD-10-CM

## 2023-11-15 DIAGNOSIS — Z80.42 FAMILY HISTORY OF PROSTATE CANCER: ICD-10-CM

## 2023-11-15 LAB
ALBUMIN SERPL-MCNC: 4.4 G/DL (ref 3.5–5)
ALBUMIN/GLOB SERPL: 1.4 RATIO (ref 1.1–2)
ALP SERPL-CCNC: 68 UNIT/L (ref 40–150)
ALT SERPL-CCNC: 18 UNIT/L (ref 0–55)
AST SERPL-CCNC: 17 UNIT/L (ref 5–34)
BASOPHILS # BLD AUTO: 0.03 X10(3)/MCL
BASOPHILS NFR BLD AUTO: 0.5 %
BILIRUB SERPL-MCNC: 0.7 MG/DL
BUN SERPL-MCNC: 18.9 MG/DL (ref 8.9–20.6)
CALCIUM SERPL-MCNC: 9.6 MG/DL (ref 8.4–10.2)
CHLORIDE SERPL-SCNC: 102 MMOL/L (ref 98–107)
CHOLEST SERPL-MCNC: 183 MG/DL
CHOLEST/HDLC SERPL: 4 {RATIO} (ref 0–5)
CO2 SERPL-SCNC: 27 MMOL/L (ref 22–29)
CREAT SERPL-MCNC: 1.25 MG/DL (ref 0.73–1.18)
EOSINOPHIL # BLD AUTO: 0.12 X10(3)/MCL (ref 0–0.9)
EOSINOPHIL NFR BLD AUTO: 2 %
ERYTHROCYTE [DISTWIDTH] IN BLOOD BY AUTOMATED COUNT: 12.3 % (ref 11.5–17)
GFR SERPLBLD CREATININE-BSD FMLA CKD-EPI: >60 MLS/MIN/1.73/M2
GLOBULIN SER-MCNC: 3.1 GM/DL (ref 2.4–3.5)
GLUCOSE SERPL-MCNC: 90 MG/DL (ref 74–100)
HCT VFR BLD AUTO: 50.8 % (ref 42–52)
HDLC SERPL-MCNC: 42 MG/DL (ref 35–60)
HGB BLD-MCNC: 17.5 G/DL (ref 14–18)
IMM GRANULOCYTES # BLD AUTO: 0.02 X10(3)/MCL (ref 0–0.04)
IMM GRANULOCYTES NFR BLD AUTO: 0.3 %
LDLC SERPL CALC-MCNC: 119 MG/DL (ref 50–140)
LYMPHOCYTES # BLD AUTO: 2.13 X10(3)/MCL (ref 0.6–4.6)
LYMPHOCYTES NFR BLD AUTO: 35.6 %
MCH RBC QN AUTO: 29.7 PG (ref 27–31)
MCHC RBC AUTO-ENTMCNC: 34.4 G/DL (ref 33–36)
MCV RBC AUTO: 86.2 FL (ref 80–94)
MONOCYTES # BLD AUTO: 0.48 X10(3)/MCL (ref 0.1–1.3)
MONOCYTES NFR BLD AUTO: 8 %
NEUTROPHILS # BLD AUTO: 3.2 X10(3)/MCL (ref 2.1–9.2)
NEUTROPHILS NFR BLD AUTO: 53.6 %
NRBC BLD AUTO-RTO: 0 %
PLATELET # BLD AUTO: 230 X10(3)/MCL (ref 130–400)
PMV BLD AUTO: 10.6 FL (ref 7.4–10.4)
POTASSIUM SERPL-SCNC: 4.5 MMOL/L (ref 3.5–5.1)
PROT SERPL-MCNC: 7.5 GM/DL (ref 6.4–8.3)
PSA SERPL-MCNC: 0.6 NG/ML
RBC # BLD AUTO: 5.89 X10(6)/MCL (ref 4.7–6.1)
SODIUM SERPL-SCNC: 136 MMOL/L (ref 136–145)
TESTOST SERPL-MCNC: 441.81 NG/DL (ref 240.24–870.68)
TRIGL SERPL-MCNC: 109 MG/DL (ref 34–140)
VLDLC SERPL CALC-MCNC: 22 MG/DL
WBC # SPEC AUTO: 5.98 X10(3)/MCL (ref 4.5–11.5)

## 2023-11-15 PROCEDURE — 80061 LIPID PANEL: CPT

## 2023-11-15 PROCEDURE — 85025 COMPLETE CBC W/AUTO DIFF WBC: CPT

## 2023-11-15 PROCEDURE — 80053 COMPREHEN METABOLIC PANEL: CPT

## 2023-11-15 PROCEDURE — 84153 ASSAY OF PSA TOTAL: CPT

## 2023-11-15 PROCEDURE — 36415 COLL VENOUS BLD VENIPUNCTURE: CPT

## 2023-11-15 PROCEDURE — 84403 ASSAY OF TOTAL TESTOSTERONE: CPT

## 2023-11-21 ENCOUNTER — OFFICE VISIT (OUTPATIENT)
Dept: INTERNAL MEDICINE | Facility: CLINIC | Age: 48
End: 2023-11-21
Payer: COMMERCIAL

## 2023-11-21 VITALS
HEART RATE: 75 BPM | RESPIRATION RATE: 18 BRPM | BODY MASS INDEX: 30.18 KG/M2 | OXYGEN SATURATION: 96 % | HEIGHT: 72 IN | WEIGHT: 222.81 LBS | DIASTOLIC BLOOD PRESSURE: 81 MMHG | SYSTOLIC BLOOD PRESSURE: 117 MMHG

## 2023-11-21 DIAGNOSIS — Z00.00 WELLNESS EXAMINATION: ICD-10-CM

## 2023-11-21 DIAGNOSIS — R79.89 DECREASED TESTOSTERONE LEVEL: ICD-10-CM

## 2023-11-21 DIAGNOSIS — K21.9 GASTROESOPHAGEAL REFLUX DISEASE, UNSPECIFIED WHETHER ESOPHAGITIS PRESENT: Primary | ICD-10-CM

## 2023-11-21 DIAGNOSIS — Z80.42 FAMILY HISTORY OF PROSTATE CANCER: ICD-10-CM

## 2023-11-21 PROCEDURE — 1159F PR MEDICATION LIST DOCUMENTED IN MEDICAL RECORD: ICD-10-PCS | Mod: CPTII,,, | Performed by: INTERNAL MEDICINE

## 2023-11-21 PROCEDURE — 99214 PR OFFICE/OUTPT VISIT, EST, LEVL IV, 30-39 MIN: ICD-10-PCS | Mod: ,,, | Performed by: INTERNAL MEDICINE

## 2023-11-21 PROCEDURE — 3074F SYST BP LT 130 MM HG: CPT | Mod: CPTII,,, | Performed by: INTERNAL MEDICINE

## 2023-11-21 PROCEDURE — 3044F PR MOST RECENT HEMOGLOBIN A1C LEVEL <7.0%: ICD-10-PCS | Mod: CPTII,,, | Performed by: INTERNAL MEDICINE

## 2023-11-21 PROCEDURE — 3044F HG A1C LEVEL LT 7.0%: CPT | Mod: CPTII,,, | Performed by: INTERNAL MEDICINE

## 2023-11-21 PROCEDURE — 3079F DIAST BP 80-89 MM HG: CPT | Mod: CPTII,,, | Performed by: INTERNAL MEDICINE

## 2023-11-21 PROCEDURE — 99214 OFFICE O/P EST MOD 30 MIN: CPT | Mod: ,,, | Performed by: INTERNAL MEDICINE

## 2023-11-21 PROCEDURE — 3079F PR MOST RECENT DIASTOLIC BLOOD PRESSURE 80-89 MM HG: ICD-10-PCS | Mod: CPTII,,, | Performed by: INTERNAL MEDICINE

## 2023-11-21 PROCEDURE — 3008F BODY MASS INDEX DOCD: CPT | Mod: CPTII,,, | Performed by: INTERNAL MEDICINE

## 2023-11-21 PROCEDURE — 3008F PR BODY MASS INDEX (BMI) DOCUMENTED: ICD-10-PCS | Mod: CPTII,,, | Performed by: INTERNAL MEDICINE

## 2023-11-21 PROCEDURE — 3074F PR MOST RECENT SYSTOLIC BLOOD PRESSURE < 130 MM HG: ICD-10-PCS | Mod: CPTII,,, | Performed by: INTERNAL MEDICINE

## 2023-11-21 PROCEDURE — 1159F MED LIST DOCD IN RCRD: CPT | Mod: CPTII,,, | Performed by: INTERNAL MEDICINE

## 2023-11-21 RX ORDER — SILDENAFIL 100 MG/1
TABLET, FILM COATED ORAL
Qty: 10 TABLET | Refills: 11 | Status: SHIPPED | OUTPATIENT
Start: 2023-11-21

## 2023-11-21 RX ORDER — TESTOSTERONE CYPIONATE 1000 MG/10ML
100 INJECTION, SOLUTION INTRAMUSCULAR
Qty: 10 ML | Refills: 1 | Status: SHIPPED | OUTPATIENT
Start: 2023-11-21 | End: 2024-03-19 | Stop reason: SDUPTHER

## 2023-11-21 NOTE — PROGRESS NOTES
Subjective:       Patient ID: Larry Eli is a 47 y.o. male.    Chief Complaint: Follow-up      The patient is a 47-year-old man in for follow-up of multiple problems.  He has a history of low testosterone.  He was on replacement hormone.  He tolerates that well.      Has noticed a small bump around the anus that he thinks is a hemorrhoid.  It was a bit painful and tender but that is getting better.    Follow-up      Review of Systems     Current Outpatient Medications on File Prior to Visit   Medication Sig Dispense Refill    [DISCONTINUED] sildenafiL (VIAGRA) 100 MG tablet TAKE 1/2 TABLET BY MOUTH ONCE DAILY FOR FIRST 2 DOSES, THEN 1 TABLET ONCE DAILY AS NEEDED FOR ERECTILE DYSFUNCTION. 10 tablet 11    [DISCONTINUED] testosterone cypionate (DEPOTESTOTERONE CYPIONATE) 100 mg/mL injection Inject 1 mL (100 mg total) into the muscle every 14 (fourteen) days. 10 mL 1     No current facility-administered medications on file prior to visit.     Objective:      /81 (BP Location: Right arm, Patient Position: Sitting, BP Method: Large (Manual))   Pulse 75   Resp 18   Ht 6' (1.829 m)   Wt 101.1 kg (222 lb 12.8 oz)   SpO2 96%   BMI 30.22 kg/m²     Physical Exam  Vitals reviewed.   Constitutional:       Appearance: Normal appearance.   HENT:      Head: Normocephalic and atraumatic.      Mouth/Throat:      Pharynx: Oropharynx is clear.   Eyes:      Pupils: Pupils are equal, round, and reactive to light.   Cardiovascular:      Rate and Rhythm: Normal rate and regular rhythm.      Pulses: Normal pulses.      Heart sounds: Normal heart sounds.   Pulmonary:      Breath sounds: Normal breath sounds.   Abdominal:      General: Abdomen is flat.      Palpations: Abdomen is soft.   Genitourinary:     Comments: Anus reveals a 0.5 cm nodule smooth and nontender consistent with hemorrhoid.  At the 2 o'clock position.  Musculoskeletal:      Cervical back: Neck supple.   Skin:     General: Skin is warm and dry.    Neurological:      Mental Status: He is alert.       Laboratory studies reviewed.  Assessment:       1. Low T syndrome.  On replacement hormone with normal levels.      2. Strong family history prostate cancer.  His PSA remains normal    3. Small external hemorrhoid.  Being treated conservatively    4. History of GERD.  Stable clinically.  Not on meds    Plan:     Continue current meds.  New prescription sent in.  Follow-up annual checkup 6 months with CBC CMP lipid PSA testosterone level prior

## 2024-01-25 ENCOUNTER — PATIENT MESSAGE (OUTPATIENT)
Dept: ADMINISTRATIVE | Facility: HOSPITAL | Age: 49
End: 2024-01-25
Payer: COMMERCIAL

## 2024-02-07 ENCOUNTER — PATIENT OUTREACH (OUTPATIENT)
Dept: ADMINISTRATIVE | Facility: HOSPITAL | Age: 49
End: 2024-02-07
Payer: COMMERCIAL

## 2024-02-07 DIAGNOSIS — Z12.11 COLON CANCER SCREENING: Primary | ICD-10-CM

## 2024-02-07 NOTE — PROGRESS NOTES
Population Health Outreach.    2/7/2024 Patient was called concerning colon cancer screening. Re-sent referral for Colonoscopy

## 2024-03-19 RX ORDER — TESTOSTERONE CYPIONATE 1000 MG/10ML
100 INJECTION, SOLUTION INTRAMUSCULAR
Qty: 10 ML | Refills: 5 | Status: SHIPPED | OUTPATIENT
Start: 2024-03-19 | End: 2024-03-20

## 2024-03-19 NOTE — TELEPHONE ENCOUNTER
----- Message from Sangeetha Escoto sent at 3/19/2024  9:18 AM CDT -----  .Type:  RX Refill Request    Who Called: pt  Refill or New Rx:refill  RX Name and Strength:testosterone cypionate (DEPOTESTOTERONE CYPIONATE) 100 mg/mL injection  How is the patient currently taking it?   Is this a 30 day or 90 day RX:  Preferred Pharmacy with phone number:[id number] - [place] - [address]  Local or Mail Order:local  Ordering Provider:  Would the patient rather a call back or a response via [facility]?   Best Call Back Number:[phone]  Additional Information: pt states pharmacy can only fill 4 ml at a time please send new script

## 2024-03-20 ENCOUNTER — TELEPHONE (OUTPATIENT)
Dept: INTERNAL MEDICINE | Facility: CLINIC | Age: 49
End: 2024-03-20
Payer: COMMERCIAL

## 2024-03-20 DIAGNOSIS — R79.89 DECREASED TESTOSTERONE LEVEL: Primary | ICD-10-CM

## 2024-03-20 RX ORDER — TESTOSTERONE CYPIONATE 200 MG/ML
100 INJECTION, SOLUTION INTRAMUSCULAR
Qty: 1 ML | Refills: 5 | Status: SHIPPED | OUTPATIENT
Start: 2024-03-20 | End: 2024-05-22 | Stop reason: SDUPTHER

## 2024-03-20 NOTE — TELEPHONE ENCOUNTER
----- Message from Cristina Burnett sent at 3/19/2024  4:52 PM CDT -----  .Type:  Patient Returning Call    Who Called:Larry  Who Left Message for Patient:PT  Does the patient know what this is regarding?:testosterone cypionate (DEPOTESTOTERONE CYPIONATE) 100 mg/mL injection  Would the patient rather a call back or a response via [facility]?   Best Call Back Number:[phone]  Additional Information: Please call in medication testosterone cypionate (DEPOTESTOTERONE CYPIONATE) 100 mg/mL injection       Patient stats he called earlier and the pharmacy did and medication has not been called

## 2024-05-01 ENCOUNTER — PATIENT OUTREACH (OUTPATIENT)
Dept: ADMINISTRATIVE | Facility: HOSPITAL | Age: 49
End: 2024-05-01
Payer: COMMERCIAL

## 2024-05-01 NOTE — PROGRESS NOTES
Population Health. Reviewing patient's chart for quality metrics. I attempt pt outreach re: HM, no answer.   5/1/24 pt returned call re: HM, discussed HM due and noted scheduled for Colonoscopy with Dr. Toro Jay at [facility] on 5/31/24, pt confirmed that he is scheduled on that date for colonoscopy, stressed importance of health screening and also getting Colorectal cancer screening. Discussed upcoming PCP appt for 5/22/24 at 2:40 pm and to remember to get lab work done prior to appt. Pt voices understanding and appreciciation.  Health Maintenance Topic(s) Outreach Outcomes & Actions Taken:    Colorectal Cancer Screening - Outreach Outcomes & Actions Taken  : noted pt has scheduled Colonoscopy 5/31/24 with Dr. Toro Jay at [facility]

## 2024-05-15 ENCOUNTER — TELEPHONE (OUTPATIENT)
Dept: INTERNAL MEDICINE | Facility: CLINIC | Age: 49
End: 2024-05-15
Payer: COMMERCIAL

## 2024-05-15 NOTE — TELEPHONE ENCOUNTER
----- Message from Angelique Dueñas LPN sent at 5/14/2024  1:29 PM CDT -----  Regarding: CARLIE House 5/22/24 @2:40p  Are there any outstanding tasks in the chart? Pt will need fasting labs      Is there any documentation of tasks? no    Please tell patient to bring living will, power of , or advance directive document to visit if they have it.

## 2024-05-20 ENCOUNTER — LAB VISIT (OUTPATIENT)
Dept: LAB | Facility: HOSPITAL | Age: 49
End: 2024-05-20
Attending: INTERNAL MEDICINE
Payer: COMMERCIAL

## 2024-05-20 DIAGNOSIS — R79.89 DECREASED TESTOSTERONE LEVEL: ICD-10-CM

## 2024-05-20 DIAGNOSIS — Z80.42 FAMILY HISTORY OF PROSTATE CANCER: ICD-10-CM

## 2024-05-20 DIAGNOSIS — Z00.00 WELLNESS EXAMINATION: ICD-10-CM

## 2024-05-20 DIAGNOSIS — K21.9 GASTROESOPHAGEAL REFLUX DISEASE, UNSPECIFIED WHETHER ESOPHAGITIS PRESENT: ICD-10-CM

## 2024-05-20 LAB
ALBUMIN SERPL-MCNC: 4.1 G/DL (ref 3.5–5)
ALBUMIN/GLOB SERPL: 1.4 RATIO (ref 1.1–2)
ALP SERPL-CCNC: 72 UNIT/L (ref 40–150)
ALT SERPL-CCNC: 28 UNIT/L (ref 0–55)
ANION GAP SERPL CALC-SCNC: 9 MEQ/L
AST SERPL-CCNC: 19 UNIT/L (ref 5–34)
BASOPHILS # BLD AUTO: 0.05 X10(3)/MCL
BASOPHILS NFR BLD AUTO: 0.8 %
BILIRUB SERPL-MCNC: 0.5 MG/DL
BUN SERPL-MCNC: 22.9 MG/DL (ref 8.4–25.7)
CALCIUM SERPL-MCNC: 9 MG/DL (ref 8.4–10.2)
CHLORIDE SERPL-SCNC: 105 MMOL/L (ref 98–107)
CHOLEST SERPL-MCNC: 193 MG/DL
CHOLEST/HDLC SERPL: 4 {RATIO} (ref 0–5)
CO2 SERPL-SCNC: 25 MMOL/L (ref 22–29)
CREAT SERPL-MCNC: 1.01 MG/DL (ref 0.73–1.18)
CREAT/UREA NIT SERPL: 23
EOSINOPHIL # BLD AUTO: 0.1 X10(3)/MCL (ref 0–0.9)
EOSINOPHIL NFR BLD AUTO: 1.5 %
ERYTHROCYTE [DISTWIDTH] IN BLOOD BY AUTOMATED COUNT: 12.6 % (ref 11.5–17)
GFR SERPLBLD CREATININE-BSD FMLA CKD-EPI: >60 ML/MIN/1.73/M2
GLOBULIN SER-MCNC: 3 GM/DL (ref 2.4–3.5)
GLUCOSE SERPL-MCNC: 84 MG/DL (ref 74–100)
HCT VFR BLD AUTO: 48.7 % (ref 42–52)
HDLC SERPL-MCNC: 50 MG/DL (ref 35–60)
HGB BLD-MCNC: 16.1 G/DL (ref 14–18)
IMM GRANULOCYTES # BLD AUTO: 0.02 X10(3)/MCL (ref 0–0.04)
IMM GRANULOCYTES NFR BLD AUTO: 0.3 %
LDLC SERPL CALC-MCNC: 124 MG/DL (ref 50–140)
LYMPHOCYTES # BLD AUTO: 2.26 X10(3)/MCL (ref 0.6–4.6)
LYMPHOCYTES NFR BLD AUTO: 34.6 %
MCH RBC QN AUTO: 29.2 PG (ref 27–31)
MCHC RBC AUTO-ENTMCNC: 33.1 G/DL (ref 33–36)
MCV RBC AUTO: 88.2 FL (ref 80–94)
MONOCYTES # BLD AUTO: 0.47 X10(3)/MCL (ref 0.1–1.3)
MONOCYTES NFR BLD AUTO: 7.2 %
NEUTROPHILS # BLD AUTO: 3.63 X10(3)/MCL (ref 2.1–9.2)
NEUTROPHILS NFR BLD AUTO: 55.6 %
NRBC BLD AUTO-RTO: 0 %
PLATELET # BLD AUTO: 210 X10(3)/MCL (ref 130–400)
PMV BLD AUTO: 9.9 FL (ref 7.4–10.4)
POTASSIUM SERPL-SCNC: 4.3 MMOL/L (ref 3.5–5.1)
PROT SERPL-MCNC: 7.1 GM/DL (ref 6.4–8.3)
PSA SERPL-MCNC: 0.58 NG/ML
RBC # BLD AUTO: 5.52 X10(6)/MCL (ref 4.7–6.1)
SODIUM SERPL-SCNC: 139 MMOL/L (ref 136–145)
TESTOST SERPL-MCNC: 459.58 NG/DL (ref 220.91–715.81)
TRIGL SERPL-MCNC: 96 MG/DL (ref 34–140)
TSH SERPL-ACNC: 1.21 UIU/ML (ref 0.35–4.94)
VLDLC SERPL CALC-MCNC: 19 MG/DL
WBC # SPEC AUTO: 6.53 X10(3)/MCL (ref 4.5–11.5)

## 2024-05-20 PROCEDURE — 80053 COMPREHEN METABOLIC PANEL: CPT

## 2024-05-20 PROCEDURE — 36415 COLL VENOUS BLD VENIPUNCTURE: CPT

## 2024-05-20 PROCEDURE — 85025 COMPLETE CBC W/AUTO DIFF WBC: CPT

## 2024-05-20 PROCEDURE — 84153 ASSAY OF PSA TOTAL: CPT

## 2024-05-20 PROCEDURE — 80061 LIPID PANEL: CPT

## 2024-05-20 PROCEDURE — 84403 ASSAY OF TOTAL TESTOSTERONE: CPT

## 2024-05-20 PROCEDURE — 84443 ASSAY THYROID STIM HORMONE: CPT

## 2024-05-22 ENCOUNTER — OFFICE VISIT (OUTPATIENT)
Dept: INTERNAL MEDICINE | Facility: CLINIC | Age: 49
End: 2024-05-22
Payer: COMMERCIAL

## 2024-05-22 VITALS
BODY MASS INDEX: 29.93 KG/M2 | HEIGHT: 72 IN | HEART RATE: 88 BPM | DIASTOLIC BLOOD PRESSURE: 70 MMHG | OXYGEN SATURATION: 98 % | WEIGHT: 221 LBS | SYSTOLIC BLOOD PRESSURE: 114 MMHG

## 2024-05-22 DIAGNOSIS — K21.9 GASTROESOPHAGEAL REFLUX DISEASE, UNSPECIFIED WHETHER ESOPHAGITIS PRESENT: ICD-10-CM

## 2024-05-22 DIAGNOSIS — Z00.00 WELLNESS EXAMINATION: Primary | ICD-10-CM

## 2024-05-22 DIAGNOSIS — Z80.42 FAMILY HISTORY OF PROSTATE CANCER: ICD-10-CM

## 2024-05-22 DIAGNOSIS — R79.89 DECREASED TESTOSTERONE LEVEL: ICD-10-CM

## 2024-05-22 DIAGNOSIS — R53.83 FATIGUE, UNSPECIFIED TYPE: ICD-10-CM

## 2024-05-22 PROCEDURE — 3078F DIAST BP <80 MM HG: CPT | Mod: CPTII,,, | Performed by: INTERNAL MEDICINE

## 2024-05-22 PROCEDURE — 3008F BODY MASS INDEX DOCD: CPT | Mod: CPTII,,, | Performed by: INTERNAL MEDICINE

## 2024-05-22 PROCEDURE — 99396 PREV VISIT EST AGE 40-64: CPT | Mod: ,,, | Performed by: INTERNAL MEDICINE

## 2024-05-22 PROCEDURE — 3074F SYST BP LT 130 MM HG: CPT | Mod: CPTII,,, | Performed by: INTERNAL MEDICINE

## 2024-05-22 RX ORDER — TESTOSTERONE CYPIONATE 200 MG/ML
100 INJECTION, SOLUTION INTRAMUSCULAR
Qty: 3 ML | Refills: 3 | Status: SHIPPED | OUTPATIENT
Start: 2024-05-22 | End: 2024-12-04

## 2024-05-22 RX ORDER — PANTOPRAZOLE SODIUM 40 MG/1
40 TABLET, DELAYED RELEASE ORAL DAILY
Qty: 30 TABLET | Refills: 3 | Status: SHIPPED | OUTPATIENT
Start: 2024-05-22 | End: 2025-05-22

## 2024-05-22 NOTE — PROGRESS NOTES
Subjective:       Patient ID: Larry Eli is a 48 y.o. male.    Chief Complaint: Annual Exam      Is a 48-year-old man in for wellness check.  He feels okay except for increasing fatigue over the past 2 weeks as well as vague chest discomfort.  He feels indigestion and heaviness in the chest.  Nonexertional.  Not clearly meal related but clearly worse when he lays down and better when he sits up.  No shortness a breath.  He does have a history of reflux disease.  He was taken Tums on a couple of occasions with possible benefit also occasional over-the-counter Zantac.    He was not use caffeine except did admit to eating too peppermint patties every night.  He drinks alcohol every 2 weeks 1 or 2 drinks.  He does not use tobacco.      Review of Systems   All other systems reviewed and are negative.       Current Outpatient Medications on File Prior to Visit   Medication Sig Dispense Refill    sildenafiL (VIAGRA) 100 MG tablet TAKE 1/2 TABLET BY MOUTH ONCE DAILY FOR FIRST 2 DOSES, THEN 1 TABLET ONCE DAILY AS NEEDED FOR ERECTILE DYSFUNCTION. 10 tablet 11    [DISCONTINUED] testosterone cypionate (DEPO-TESTOSTERONE) 200 mg/mL injection Inject 0.5 mLs (100 mg total) into the muscle every 14 (fourteen) days. 1 mL 5     No current facility-administered medications on file prior to visit.     Objective:      /70 (BP Location: Right arm, Patient Position: Sitting, BP Method: Small (Manual))   Pulse 88   Ht 6' (1.829 m)   Wt 100.2 kg (221 lb)   SpO2 98%   BMI 29.97 kg/m²     Physical Exam  Vitals reviewed.   Constitutional:       Appearance: Normal appearance.   HENT:      Head: Normocephalic and atraumatic.      Right Ear: Tympanic membrane normal.      Left Ear: Tympanic membrane normal.      Mouth/Throat:      Pharynx: Oropharynx is clear.   Eyes:      Pupils: Pupils are equal, round, and reactive to light.   Neck:      Vascular: No carotid bruit.   Cardiovascular:      Rate and Rhythm: Normal rate and  regular rhythm.      Pulses: Normal pulses.      Heart sounds: Normal heart sounds.   Pulmonary:      Effort: Pulmonary effort is normal.      Breath sounds: Normal breath sounds.   Abdominal:      General: Abdomen is flat.      Palpations: Abdomen is soft. There is no mass.      Tenderness: There is no abdominal tenderness. There is no guarding.   Musculoskeletal:         General: No swelling.      Cervical back: Neck supple.   Lymphadenopathy:      Cervical: No cervical adenopathy.   Skin:     General: Skin is warm and dry.   Neurological:      General: No focal deficit present.      Mental Status: He is alert and oriented to person, place, and time.       Lab work reviewed  Assessment:       1. Wellness     2. GERD.  Likely the source of his recent discomfort.    3. Low T syndrome.  On replacement hormones.  Levels are fine     4. Generalized fatigue.  Etiology unclear.  Consider obstructive sleep apnea.  He does admit to snoring like a freight train.  He does not know of apneic episodes.    5. In need of screening colonoscopy.  Scheduled for next week    6. Family history prostate cancer.  His PSA levels remain very low.    Plan:     Add Protonix 40 daily.  Discontinue peppermint patties and other chocolates.  Follow-up with me 6 weeks.  Consider sleep study.

## 2024-05-31 ENCOUNTER — DOCUMENTATION ONLY (OUTPATIENT)
Dept: INTERNAL MEDICINE | Facility: CLINIC | Age: 49
End: 2024-05-31
Payer: COMMERCIAL

## 2024-05-31 LAB — CRC RECOMMENDATION EXT: NORMAL

## 2024-06-06 ENCOUNTER — PATIENT OUTREACH (OUTPATIENT)
Facility: CLINIC | Age: 49
End: 2024-06-06
Payer: COMMERCIAL

## 2024-06-06 NOTE — PROGRESS NOTES
Population Health. Reviewing patient's chart for quality metrics.  Health Maintenance Topic(s) Outreach Outcomes & Actions Taken:    Colorectal Cancer Screening - Outreach Outcomes & Actions Taken  : 5/31/2024 colonoscopy report in chart, care gap closure.

## 2024-07-03 ENCOUNTER — TELEPHONE (OUTPATIENT)
Dept: INTERNAL MEDICINE | Facility: CLINIC | Age: 49
End: 2024-07-03
Payer: COMMERCIAL

## 2024-07-03 NOTE — TELEPHONE ENCOUNTER
----- Message from Jennifer Betts LPN sent at 7/2/2024 12:04 PM CDT -----  Regarding: leelee Nguyen 07/11 @3:00  Correction, no labs needed.

## 2024-07-03 NOTE — TELEPHONE ENCOUNTER
----- Message from Jennifer Betts LPN sent at 7/2/2024 12:03 PM CDT -----  Regarding: leelee Nguyen 07/11 @3:00  Fasting labs needed.

## 2024-07-11 ENCOUNTER — OFFICE VISIT (OUTPATIENT)
Dept: INTERNAL MEDICINE | Facility: CLINIC | Age: 49
End: 2024-07-11
Payer: COMMERCIAL

## 2024-07-11 VITALS
RESPIRATION RATE: 18 BRPM | HEIGHT: 72 IN | DIASTOLIC BLOOD PRESSURE: 69 MMHG | OXYGEN SATURATION: 95 % | HEART RATE: 74 BPM | WEIGHT: 225 LBS | SYSTOLIC BLOOD PRESSURE: 117 MMHG | BODY MASS INDEX: 30.48 KG/M2

## 2024-07-11 DIAGNOSIS — R06.83 LOUD SNORING: ICD-10-CM

## 2024-07-11 DIAGNOSIS — R53.83 FATIGUE, UNSPECIFIED TYPE: ICD-10-CM

## 2024-07-11 DIAGNOSIS — Z80.42 FAMILY HISTORY OF PROSTATE CANCER: ICD-10-CM

## 2024-07-11 DIAGNOSIS — K21.9 GASTROESOPHAGEAL REFLUX DISEASE, UNSPECIFIED WHETHER ESOPHAGITIS PRESENT: Primary | ICD-10-CM

## 2024-07-11 DIAGNOSIS — R79.89 DECREASED TESTOSTERONE LEVEL: ICD-10-CM

## 2024-07-11 PROCEDURE — 3074F SYST BP LT 130 MM HG: CPT | Mod: CPTII,,, | Performed by: INTERNAL MEDICINE

## 2024-07-11 PROCEDURE — 3078F DIAST BP <80 MM HG: CPT | Mod: CPTII,,, | Performed by: INTERNAL MEDICINE

## 2024-07-11 PROCEDURE — 3008F BODY MASS INDEX DOCD: CPT | Mod: CPTII,,, | Performed by: INTERNAL MEDICINE

## 2024-07-11 PROCEDURE — 99213 OFFICE O/P EST LOW 20 MIN: CPT | Mod: ,,, | Performed by: INTERNAL MEDICINE

## 2024-07-11 PROCEDURE — 1159F MED LIST DOCD IN RCRD: CPT | Mod: CPTII,,, | Performed by: INTERNAL MEDICINE

## 2024-07-11 NOTE — PROGRESS NOTES
Subjective:       Patient ID: Larry Eli is a 48 y.o. male.    Chief Complaint: Follow-up      The patient is a 48-year-old man in for follow-up of recent fatigue indigestion and chest heaviness.  The latter symptoms resolved after he got off the peppermint patties and chocolate.  The fatigue has persisted.  He did not want to do a sleep study although he was willing to do a home sleep test.  He did admit that he does sleep fairly well but does snore like a [employer] train.    Follow-up      Review of Systems     Current Outpatient Medications on File Prior to Visit   Medication Sig Dispense Refill    sildenafiL (VIAGRA) 100 MG tablet TAKE 1/2 TABLET BY MOUTH ONCE DAILY FOR FIRST 2 DOSES, THEN 1 TABLET ONCE DAILY AS NEEDED FOR ERECTILE DYSFUNCTION. 10 tablet 11    testosterone cypionate (DEPO-TESTOSTERONE) 200 mg/mL injection Inject 0.5 mLs (100 mg total) into the muscle every 14 (fourteen) days. 3 mL 3    [DISCONTINUED] pantoprazole (PROTONIX) 40 MG tablet Take 1 tablet (40 mg total) by mouth once daily. (Patient not taking: Reported on 7/11/2024) 30 tablet 3     No current facility-administered medications on file prior to visit.     Objective:      /69 (BP Location: Right arm, Patient Position: Sitting, BP Method: Large (Manual))   Pulse 74   Resp 18   Ht 6' (1.829 m)   Wt 102.1 kg (225 lb)   SpO2 95%   BMI 30.52 kg/m²     Physical Exam  general appearance is unremarkable.  Weight went up 4 lb.  He has in no distress.  Assessment:       1. GERD.  Exacerbated by diet but now much better.  He did not tolerate Protonix due to idiosyncratic side effects and only took a few doses.      2. Fatigue.  Nonspecific.  Consider sleep apnea.  A home tests should be adequate for screening     3. Low T syndrome     4. Family history prostate CA.  Plan:     Home sleep study.  Follow-up 6 months with CBC CMP lipid TSH testosterone and PSA level

## 2024-07-12 ENCOUNTER — TELEPHONE (OUTPATIENT)
Dept: NEUROLOGY | Facility: CLINIC | Age: 49
End: 2024-07-12
Payer: COMMERCIAL

## 2024-07-12 DIAGNOSIS — G47.30 SLEEP APNEA, UNSPECIFIED TYPE: Primary | ICD-10-CM

## 2024-07-15 ENCOUNTER — PATIENT MESSAGE (OUTPATIENT)
Dept: SLEEP MEDICINE | Facility: HOSPITAL | Age: 49
End: 2024-07-15
Payer: COMMERCIAL

## 2024-07-23 ENCOUNTER — PROCEDURE VISIT (OUTPATIENT)
Dept: SLEEP MEDICINE | Facility: HOSPITAL | Age: 49
End: 2024-07-23
Attending: NURSE PRACTITIONER
Payer: COMMERCIAL

## 2024-07-23 DIAGNOSIS — G47.30 SLEEP APNEA, UNSPECIFIED TYPE: ICD-10-CM

## 2024-07-23 PROCEDURE — G0399 HOME SLEEP TEST/TYPE 3 PORTA: HCPCS

## 2024-07-23 PROCEDURE — G0399 HOME SLEEP TEST/TYPE 3 PORTA: HCPCS | Mod: 26,,, | Performed by: PSYCHIATRY & NEUROLOGY

## 2024-08-05 PROBLEM — G47.33 OSA ON CPAP: Status: ACTIVE | Noted: 2024-08-05

## 2024-08-06 ENCOUNTER — OFFICE VISIT (OUTPATIENT)
Dept: NEUROLOGY | Facility: CLINIC | Age: 49
End: 2024-08-06
Payer: COMMERCIAL

## 2024-08-06 DIAGNOSIS — G47.33 OBSTRUCTIVE SLEEP APNEA SYNDROME: Primary | ICD-10-CM

## 2024-08-06 PROCEDURE — 1159F MED LIST DOCD IN RCRD: CPT | Mod: CPTII,95,, | Performed by: NURSE PRACTITIONER

## 2024-08-06 PROCEDURE — 1160F RVW MEDS BY RX/DR IN RCRD: CPT | Mod: CPTII,95,, | Performed by: NURSE PRACTITIONER

## 2024-08-06 PROCEDURE — 99203 OFFICE O/P NEW LOW 30 MIN: CPT | Mod: 95,,, | Performed by: NURSE PRACTITIONER

## 2024-11-25 DIAGNOSIS — R79.89 DECREASED TESTOSTERONE LEVEL: ICD-10-CM

## 2024-11-25 RX ORDER — SILDENAFIL 100 MG/1
TABLET, FILM COATED ORAL
Qty: 10 TABLET | Refills: 11 | Status: SHIPPED | OUTPATIENT
Start: 2024-11-25

## 2024-11-25 RX ORDER — TESTOSTERONE CYPIONATE 200 MG/ML
100 INJECTION, SOLUTION INTRAMUSCULAR
Qty: 3 ML | Refills: 3 | Status: SHIPPED | OUTPATIENT
Start: 2024-11-25 | End: 2025-10-27

## 2025-01-08 ENCOUNTER — TELEPHONE (OUTPATIENT)
Dept: INTERNAL MEDICINE | Facility: CLINIC | Age: 50
End: 2025-01-08
Payer: COMMERCIAL

## 2025-01-08 NOTE — TELEPHONE ENCOUNTER
----- Message from Nurse Jennifer sent at 1/8/2025 10:04 AM CST -----  Regarding: leelee Nguyen 01/14 @3:00  Fasting labs needed.

## 2025-01-14 ENCOUNTER — LAB VISIT (OUTPATIENT)
Dept: LAB | Facility: HOSPITAL | Age: 50
End: 2025-01-14
Attending: INTERNAL MEDICINE
Payer: COMMERCIAL

## 2025-01-14 DIAGNOSIS — R79.89 DECREASED TESTOSTERONE LEVEL: ICD-10-CM

## 2025-01-14 DIAGNOSIS — R53.83 FATIGUE, UNSPECIFIED TYPE: ICD-10-CM

## 2025-01-14 DIAGNOSIS — R06.83 LOUD SNORING: ICD-10-CM

## 2025-01-14 DIAGNOSIS — K21.9 GASTROESOPHAGEAL REFLUX DISEASE, UNSPECIFIED WHETHER ESOPHAGITIS PRESENT: ICD-10-CM

## 2025-01-14 DIAGNOSIS — Z80.42 FAMILY HISTORY OF PROSTATE CANCER: ICD-10-CM

## 2025-01-14 LAB
ALBUMIN SERPL-MCNC: 4.3 G/DL (ref 3.5–5)
ALBUMIN/GLOB SERPL: 1.5 RATIO (ref 1.1–2)
ALP SERPL-CCNC: 74 UNIT/L (ref 40–150)
ALT SERPL-CCNC: 22 UNIT/L (ref 0–55)
ANION GAP SERPL CALC-SCNC: 6 MEQ/L
AST SERPL-CCNC: 19 UNIT/L (ref 5–34)
BASOPHILS # BLD AUTO: 0.04 X10(3)/MCL
BASOPHILS NFR BLD AUTO: 0.6 %
BILIRUB SERPL-MCNC: 0.9 MG/DL
BUN SERPL-MCNC: 12.9 MG/DL (ref 8.4–25.7)
CALCIUM SERPL-MCNC: 9.3 MG/DL (ref 8.4–10.2)
CHLORIDE SERPL-SCNC: 106 MMOL/L (ref 98–107)
CHOLEST SERPL-MCNC: 190 MG/DL
CHOLEST/HDLC SERPL: 4 {RATIO} (ref 0–5)
CO2 SERPL-SCNC: 27 MMOL/L (ref 22–29)
CREAT SERPL-MCNC: 1.23 MG/DL (ref 0.72–1.25)
CREAT/UREA NIT SERPL: 10
EOSINOPHIL # BLD AUTO: 0.1 X10(3)/MCL (ref 0–0.9)
EOSINOPHIL NFR BLD AUTO: 1.6 %
ERYTHROCYTE [DISTWIDTH] IN BLOOD BY AUTOMATED COUNT: 12.1 % (ref 11.5–17)
GFR SERPLBLD CREATININE-BSD FMLA CKD-EPI: >60 ML/MIN/1.73/M2
GLOBULIN SER-MCNC: 2.9 GM/DL (ref 2.4–3.5)
GLUCOSE SERPL-MCNC: 93 MG/DL (ref 74–100)
HCT VFR BLD AUTO: 47.5 % (ref 42–52)
HDLC SERPL-MCNC: 47 MG/DL (ref 35–60)
HGB BLD-MCNC: 16.2 G/DL (ref 14–18)
IMM GRANULOCYTES # BLD AUTO: 0.02 X10(3)/MCL (ref 0–0.04)
IMM GRANULOCYTES NFR BLD AUTO: 0.3 %
LDLC SERPL CALC-MCNC: 127 MG/DL (ref 50–140)
LYMPHOCYTES # BLD AUTO: 1.84 X10(3)/MCL (ref 0.6–4.6)
LYMPHOCYTES NFR BLD AUTO: 28.7 %
MCH RBC QN AUTO: 29.6 PG (ref 27–31)
MCHC RBC AUTO-ENTMCNC: 34.1 G/DL (ref 33–36)
MCV RBC AUTO: 86.7 FL (ref 80–94)
MONOCYTES # BLD AUTO: 0.45 X10(3)/MCL (ref 0.1–1.3)
MONOCYTES NFR BLD AUTO: 7 %
NEUTROPHILS # BLD AUTO: 3.96 X10(3)/MCL (ref 2.1–9.2)
NEUTROPHILS NFR BLD AUTO: 61.8 %
NRBC BLD AUTO-RTO: 0 %
PLATELET # BLD AUTO: 240 X10(3)/MCL (ref 130–400)
PMV BLD AUTO: 10.3 FL (ref 7.4–10.4)
POTASSIUM SERPL-SCNC: 4.6 MMOL/L (ref 3.5–5.1)
PROT SERPL-MCNC: 7.2 GM/DL (ref 6.4–8.3)
PSA SERPL-MCNC: 0.58 NG/ML
RBC # BLD AUTO: 5.48 X10(6)/MCL (ref 4.7–6.1)
SODIUM SERPL-SCNC: 139 MMOL/L (ref 136–145)
TESTOST SERPL-MCNC: 482 NG/DL (ref 220.91–715.81)
TRIGL SERPL-MCNC: 79 MG/DL (ref 34–140)
VLDLC SERPL CALC-MCNC: 16 MG/DL
WBC # BLD AUTO: 6.41 X10(3)/MCL (ref 4.5–11.5)

## 2025-01-14 PROCEDURE — 84153 ASSAY OF PSA TOTAL: CPT

## 2025-01-14 PROCEDURE — 36415 COLL VENOUS BLD VENIPUNCTURE: CPT

## 2025-01-14 PROCEDURE — 80053 COMPREHEN METABOLIC PANEL: CPT

## 2025-01-14 PROCEDURE — 85025 COMPLETE CBC W/AUTO DIFF WBC: CPT

## 2025-01-14 PROCEDURE — 80061 LIPID PANEL: CPT

## 2025-01-14 PROCEDURE — 84403 ASSAY OF TOTAL TESTOSTERONE: CPT

## 2025-01-15 ENCOUNTER — OFFICE VISIT (OUTPATIENT)
Dept: INTERNAL MEDICINE | Facility: CLINIC | Age: 50
End: 2025-01-15
Payer: COMMERCIAL

## 2025-01-15 VITALS
HEIGHT: 72 IN | BODY MASS INDEX: 29.8 KG/M2 | DIASTOLIC BLOOD PRESSURE: 80 MMHG | WEIGHT: 220 LBS | SYSTOLIC BLOOD PRESSURE: 122 MMHG | RESPIRATION RATE: 18 BRPM | HEART RATE: 70 BPM | OXYGEN SATURATION: 98 %

## 2025-01-15 DIAGNOSIS — Z00.00 WELLNESS EXAMINATION: ICD-10-CM

## 2025-01-15 DIAGNOSIS — R79.89 DECREASED TESTOSTERONE LEVEL: ICD-10-CM

## 2025-01-15 DIAGNOSIS — G47.33 OSA (OBSTRUCTIVE SLEEP APNEA): Primary | ICD-10-CM

## 2025-01-15 DIAGNOSIS — K21.9 GASTROESOPHAGEAL REFLUX DISEASE, UNSPECIFIED WHETHER ESOPHAGITIS PRESENT: ICD-10-CM

## 2025-01-15 PROCEDURE — 1159F MED LIST DOCD IN RCRD: CPT | Mod: CPTII,,, | Performed by: INTERNAL MEDICINE

## 2025-01-15 PROCEDURE — 3079F DIAST BP 80-89 MM HG: CPT | Mod: CPTII,,, | Performed by: INTERNAL MEDICINE

## 2025-01-15 PROCEDURE — 3008F BODY MASS INDEX DOCD: CPT | Mod: CPTII,,, | Performed by: INTERNAL MEDICINE

## 2025-01-15 PROCEDURE — 3074F SYST BP LT 130 MM HG: CPT | Mod: CPTII,,, | Performed by: INTERNAL MEDICINE

## 2025-01-15 PROCEDURE — 99213 OFFICE O/P EST LOW 20 MIN: CPT | Mod: ,,, | Performed by: INTERNAL MEDICINE

## 2025-01-15 NOTE — PROGRESS NOTES
Patient ID: [id number]      Subjective:     Chief Complaint: Follow-up      Larry Eli is a 49 y.o. male.  Patient is a 49-year-old man in for follow-up of multiple problems.  In particular he was having fatigue last time.  We did send him for a sleep study.  It appears he had mild-to-moderate obstructive sleep apnea.  He was recommended to do the CPAP device.  He did try an oral appliance that did not help.  This has an over-the-counter version.  Overall he does feel somewhat better than he did last time.    Follow-up        Review of Systems    Outpatient Medications Marked as Taking for the 1/15/25 encounter (Office Visit) with Jesus House MD   Medication Sig Dispense Refill    sildenafiL (VIAGRA) 100 MG tablet TAKE 1/2 TABLET BY MOUTH ONCE DAILY FOR FIRST 2 DOSES, THEN 1 TABLET ONCE DAILY AS NEEDED FOR ERECTILE DYSFUNCTION. 10 tablet 11    testosterone cypionate (DEPO-TESTOSTERONE) 200 mg/mL injection Inject 0.5 mLs (100 mg total) into the muscle every 14 (fourteen) days. 3 mL 3       Objective:     /80 (BP Location: Right arm, Patient Position: Sitting)   Pulse 70   Resp 18   Ht 6' (1.829 m)   Wt 99.8 kg (220 lb)   SpO2 98%   BMI 29.84 kg/m²     Physical Exam  Vitals reviewed.   Constitutional:       Appearance: Normal appearance.   HENT:      Head: Normocephalic and atraumatic.      Mouth/Throat:      Pharynx: Oropharynx is clear.   Eyes:      Pupils: Pupils are equal, round, and reactive to light.   Cardiovascular:      Rate and Rhythm: Normal rate and regular rhythm.      Pulses: Normal pulses.      Heart sounds: Normal heart sounds.   Pulmonary:      Breath sounds: Normal breath sounds.   Abdominal:      General: Abdomen is flat.      Palpations: Abdomen is soft.   Musculoskeletal:      Cervical back: Neck supple.   Skin:     General: Skin is warm and dry.   Neurological:      Mental Status: He is alert.     Lab work reviewed    Assessment:     1. Obstructive sleep apnea.  He is  still trying to decide if and how to treat it.    2. Stable GERD.  Essentially asymptomatic now     3. 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  Doing well    Plan:   Continue current meds.  Follow-up with me annual checkup in 6 months with CBC CMP lipid TSH PSA and testosterone level prior  Problem List Items Addressed This Visit          Endocrine    Decreased testosterone level    Relevant Orders    CBC Auto Differential    Comprehensive Metabolic Panel    Lipid Panel    TSH    PSA, Screening    Testosterone       GI    Gastroesophageal reflux disease    Relevant Orders    CBC Auto Differential    Comprehensive Metabolic Panel    Lipid Panel    TSH    PSA, Screening    Testosterone     Other Visit Diagnoses       TITA (obstructive sleep apnea)    -  Primary    Relevant Orders    CBC Auto Differential    Comprehensive Metabolic Panel    Lipid Panel    TSH    PSA, Screening    Testosterone    Wellness examination        Relevant Orders    CBC Auto Differential    Comprehensive Metabolic Panel    Lipid Panel    TSH    PSA, Screening    Testosterone             Orders Placed This Encounter   Procedures    CBC Auto Differential     Standing Status:   Future     Standing Expiration Date:   1/15/2026    Comprehensive Metabolic Panel     Standing Status:   Future     Standing Expiration Date:   1/15/2026    Lipid Panel     Standing Status:   Future     Standing Expiration Date:   1/15/2026    TSH     Standing Status:   Future     Standing Expiration Date:   1/15/2026    PSA, Screening     Standing Status:   Future     Standing Expiration Date:   1/15/2026    Testosterone     Standing Status:   Future     Standing Expiration Date:   4/15/2026     Order Specific Question:   Send normal result to authorizing provider's In Basket if patient is active on MyChart:     Answer:   Yes        Medication List with Changes/Refills   Current Medications    SILDENAFIL (VIAGRA) 100 MG TABLET    TAKE 1/2 TABLET BY MOUTH ONCE DAILY FOR  FIRST 2 DOSES, THEN 1 TABLET ONCE DAILY AS NEEDED FOR ERECTILE DYSFUNCTION.       Start Date: 11/25/2024End Date: --    TESTOSTERONE CYPIONATE (DEPO-TESTOSTERONE) 200 MG/ML INJECTION    Inject 0.5 mLs (100 mg total) into the muscle every 14 (fourteen) days.       Start Date: 11/25/2024End Date: 10/27/2025            Follow up in about 6 months (around 7/15/2025). In addition to their scheduled follow up, the patient has also been instructed to follow up on as needed basis.       Jesus House

## 2025-07-02 ENCOUNTER — TELEPHONE (OUTPATIENT)
Dept: INTERNAL MEDICINE | Facility: CLINIC | Age: 50
End: 2025-07-02
Payer: COMMERCIAL

## 2025-07-02 NOTE — TELEPHONE ENCOUNTER
----- Message from Nurse Jennifer sent at 7/1/2025 10:53 AM CDT -----  Regarding: leelee Nguyen 07/09 @3:40  Fasting labs needed.

## 2025-07-07 ENCOUNTER — LAB VISIT (OUTPATIENT)
Dept: LAB | Facility: HOSPITAL | Age: 50
End: 2025-07-07
Attending: INTERNAL MEDICINE
Payer: COMMERCIAL

## 2025-07-07 DIAGNOSIS — K21.9 GASTROESOPHAGEAL REFLUX DISEASE, UNSPECIFIED WHETHER ESOPHAGITIS PRESENT: ICD-10-CM

## 2025-07-07 DIAGNOSIS — G47.33 OSA (OBSTRUCTIVE SLEEP APNEA): ICD-10-CM

## 2025-07-07 DIAGNOSIS — Z00.00 WELLNESS EXAMINATION: ICD-10-CM

## 2025-07-07 DIAGNOSIS — R79.89 DECREASED TESTOSTERONE LEVEL: ICD-10-CM

## 2025-07-07 LAB
ALBUMIN SERPL-MCNC: 4.2 G/DL (ref 3.5–5)
ALBUMIN/GLOB SERPL: 1.4 RATIO (ref 1.1–2)
ALP SERPL-CCNC: 76 UNIT/L (ref 40–150)
ALT SERPL-CCNC: 30 UNIT/L (ref 0–55)
ANION GAP SERPL CALC-SCNC: 5 MEQ/L
AST SERPL-CCNC: 22 UNIT/L (ref 11–45)
BASOPHILS # BLD AUTO: 0.04 X10(3)/MCL
BASOPHILS NFR BLD AUTO: 0.6 %
BILIRUB SERPL-MCNC: 0.7 MG/DL
BUN SERPL-MCNC: 17.7 MG/DL (ref 8.4–25.7)
CALCIUM SERPL-MCNC: 9.3 MG/DL (ref 8.4–10.2)
CHLORIDE SERPL-SCNC: 105 MMOL/L (ref 98–107)
CHOLEST SERPL-MCNC: 186 MG/DL
CHOLEST/HDLC SERPL: 4 {RATIO} (ref 0–5)
CO2 SERPL-SCNC: 29 MMOL/L (ref 22–29)
CREAT SERPL-MCNC: 1.08 MG/DL (ref 0.72–1.25)
CREAT/UREA NIT SERPL: 16
EOSINOPHIL # BLD AUTO: 0.11 X10(3)/MCL (ref 0–0.9)
EOSINOPHIL NFR BLD AUTO: 1.8 %
ERYTHROCYTE [DISTWIDTH] IN BLOOD BY AUTOMATED COUNT: 12.4 % (ref 11.5–17)
GFR SERPLBLD CREATININE-BSD FMLA CKD-EPI: >60 ML/MIN/1.73/M2
GLOBULIN SER-MCNC: 3.1 GM/DL (ref 2.4–3.5)
GLUCOSE SERPL-MCNC: 91 MG/DL (ref 74–100)
HCT VFR BLD AUTO: 48.6 % (ref 42–52)
HDLC SERPL-MCNC: 49 MG/DL (ref 35–60)
HGB BLD-MCNC: 16.4 G/DL (ref 14–18)
IMM GRANULOCYTES # BLD AUTO: 0.02 X10(3)/MCL (ref 0–0.04)
IMM GRANULOCYTES NFR BLD AUTO: 0.3 %
LDLC SERPL CALC-MCNC: 122 MG/DL (ref 50–140)
LYMPHOCYTES # BLD AUTO: 2.18 X10(3)/MCL (ref 0.6–4.6)
LYMPHOCYTES NFR BLD AUTO: 35 %
MCH RBC QN AUTO: 29.8 PG (ref 27–31)
MCHC RBC AUTO-ENTMCNC: 33.7 G/DL (ref 33–36)
MCV RBC AUTO: 88.4 FL (ref 80–94)
MONOCYTES # BLD AUTO: 0.48 X10(3)/MCL (ref 0.1–1.3)
MONOCYTES NFR BLD AUTO: 7.7 %
NEUTROPHILS # BLD AUTO: 3.4 X10(3)/MCL (ref 2.1–9.2)
NEUTROPHILS NFR BLD AUTO: 54.6 %
NRBC BLD AUTO-RTO: 0 %
PLATELET # BLD AUTO: 241 X10(3)/MCL (ref 130–400)
PMV BLD AUTO: 10.2 FL (ref 7.4–10.4)
POTASSIUM SERPL-SCNC: 4.7 MMOL/L (ref 3.5–5.1)
PROT SERPL-MCNC: 7.3 GM/DL (ref 6.4–8.3)
PSA SERPL-MCNC: 0.89 NG/ML
RBC # BLD AUTO: 5.5 X10(6)/MCL (ref 4.7–6.1)
SODIUM SERPL-SCNC: 139 MMOL/L (ref 136–145)
TESTOST SERPL-MCNC: 283.93 NG/DL (ref 220.91–715.81)
TRIGL SERPL-MCNC: 75 MG/DL (ref 34–140)
TSH SERPL-ACNC: 1.23 UIU/ML (ref 0.35–4.94)
VLDLC SERPL CALC-MCNC: 15 MG/DL
WBC # BLD AUTO: 6.23 X10(3)/MCL (ref 4.5–11.5)

## 2025-07-07 PROCEDURE — 85025 COMPLETE CBC W/AUTO DIFF WBC: CPT

## 2025-07-07 PROCEDURE — 80053 COMPREHEN METABOLIC PANEL: CPT

## 2025-07-07 PROCEDURE — 36415 COLL VENOUS BLD VENIPUNCTURE: CPT

## 2025-07-07 PROCEDURE — 84443 ASSAY THYROID STIM HORMONE: CPT

## 2025-07-07 PROCEDURE — 84403 ASSAY OF TOTAL TESTOSTERONE: CPT

## 2025-07-07 PROCEDURE — 84153 ASSAY OF PSA TOTAL: CPT

## 2025-07-07 PROCEDURE — 80061 LIPID PANEL: CPT

## 2025-07-09 ENCOUNTER — OFFICE VISIT (OUTPATIENT)
Dept: INTERNAL MEDICINE | Facility: CLINIC | Age: 50
End: 2025-07-09
Payer: COMMERCIAL

## 2025-07-09 VITALS
WEIGHT: 228.38 LBS | DIASTOLIC BLOOD PRESSURE: 70 MMHG | HEART RATE: 78 BPM | HEIGHT: 72 IN | RESPIRATION RATE: 18 BRPM | BODY MASS INDEX: 30.93 KG/M2 | SYSTOLIC BLOOD PRESSURE: 126 MMHG | OXYGEN SATURATION: 98 %

## 2025-07-09 DIAGNOSIS — R79.89 DECREASED TESTOSTERONE LEVEL: ICD-10-CM

## 2025-07-09 DIAGNOSIS — G47.33 OSA (OBSTRUCTIVE SLEEP APNEA): ICD-10-CM

## 2025-07-09 DIAGNOSIS — Z00.00 WELLNESS EXAMINATION: Primary | ICD-10-CM

## 2025-07-09 DIAGNOSIS — E66.9 OBESITY, UNSPECIFIED CLASS, UNSPECIFIED OBESITY TYPE, UNSPECIFIED WHETHER SERIOUS COMORBIDITY PRESENT: ICD-10-CM

## 2025-07-09 PROCEDURE — 3074F SYST BP LT 130 MM HG: CPT | Mod: CPTII,,, | Performed by: INTERNAL MEDICINE

## 2025-07-09 PROCEDURE — 3078F DIAST BP <80 MM HG: CPT | Mod: CPTII,,, | Performed by: INTERNAL MEDICINE

## 2025-07-09 PROCEDURE — 3008F BODY MASS INDEX DOCD: CPT | Mod: CPTII,,, | Performed by: INTERNAL MEDICINE

## 2025-07-09 PROCEDURE — 1159F MED LIST DOCD IN RCRD: CPT | Mod: CPTII,,, | Performed by: INTERNAL MEDICINE

## 2025-07-09 PROCEDURE — 99396 PREV VISIT EST AGE 40-64: CPT | Mod: ,,, | Performed by: INTERNAL MEDICINE

## 2025-07-09 RX ORDER — TIRZEPATIDE 2.5 MG/.5ML
2.5 INJECTION, SOLUTION SUBCUTANEOUS
Qty: 2 ML | Refills: 0 | Status: SHIPPED | OUTPATIENT
Start: 2025-07-09 | End: 2025-08-06

## 2025-07-09 RX ORDER — TIRZEPATIDE 7.5 MG/.5ML
7.5 INJECTION, SOLUTION SUBCUTANEOUS
Qty: 2 ML | Refills: 5 | Status: SHIPPED | OUTPATIENT
Start: 2025-09-05

## 2025-07-09 RX ORDER — TIRZEPATIDE 5 MG/.5ML
5 INJECTION, SOLUTION SUBCUTANEOUS
Qty: 2 ML | Refills: 0 | Status: SHIPPED | OUTPATIENT
Start: 2025-08-07 | End: 2025-09-04

## 2025-07-09 NOTE — PROGRESS NOTES
Patient ID: [id number]      Subjective:     Chief Complaint: Annual Exam      Larry Eli is a 49 y.o. male.  The patient is a 49-year-old man in for wellness check.  He is feeling fairly well overall.  Some generalized fatigue.  He tolerates the testosterone injections fine.  He is asking about the possible use of GLP 1 therapy for weight reduction.    Not exercising routinely.  Diet that has regular.  He does not smoke and drinks alcohol occasionally.        Review of Systems    No outpatient medications have been marked as taking for the 7/9/25 encounter (Office Visit) with Jesus House MD.       Objective:     /70 (BP Location: Left arm, Patient Position: Sitting)   Pulse 78   Resp 18   Ht 6' (1.829 m)   Wt 103.6 kg (228 lb 6.4 oz)   SpO2 98%   BMI 30.98 kg/m²     Physical Exam  Vitals reviewed.   Constitutional:       Appearance: Normal appearance.   HENT:      Head: Normocephalic and atraumatic.      Right Ear: Tympanic membrane normal.      Left Ear: Tympanic membrane normal.      Mouth/Throat:      Pharynx: Oropharynx is clear.   Eyes:      Pupils: Pupils are equal, round, and reactive to light.   Neck:      Vascular: No carotid bruit.   Cardiovascular:      Rate and Rhythm: Normal rate and regular rhythm.      Pulses: Normal pulses.      Heart sounds: Normal heart sounds.   Pulmonary:      Effort: Pulmonary effort is normal.      Breath sounds: Normal breath sounds.   Abdominal:      General: Abdomen is flat.      Palpations: Abdomen is soft. There is no mass.      Tenderness: There is no abdominal tenderness. There is no guarding.   Musculoskeletal:         General: No swelling.      Cervical back: Neck supple.   Lymphadenopathy:      Cervical: No cervical adenopathy.   Skin:     General: Skin is warm and dry.   Neurological:      General: No focal deficit present.      Mental Status: He is alert and oriented to person, place, and time.     Lab work reviewed.  Numbers are  surprisingly good    Assessment:     1. Wellness     2. Low testosterone syndrome.  On replacement hormone.  No toxicity and levels are good     3. Obstructive sleep apnea.  Mild by study done 11 months ago    4. Obesity.  He does meet the FDA approved criteria for the use of GLP 1 therapy    Plan:   Try Zetia bound 2.5 mg subQ weekly x1 month followed by 5 weekly x1 month and then 7.5 thereafter.  We will try to get it approved through the pharmacy with the insurance coverage but if that fails then we will try the [employer] direct approach.    Tentative follow-up 6 months with CBC CMP lipid testosterone prior  Problem List Items Addressed This Visit          Endocrine    Decreased testosterone level    Relevant Orders    CBC Auto Differential    Comprehensive Metabolic Panel    Lipid Panel     Other Visit Diagnoses         Wellness examination    -  Primary    Relevant Orders    CBC Auto Differential    Comprehensive Metabolic Panel    Lipid Panel      TITA (obstructive sleep apnea)        Relevant Orders    CBC Auto Differential    Comprehensive Metabolic Panel    Lipid Panel      Obesity, unspecified class, unspecified obesity type, unspecified whether serious comorbidity present        Relevant Orders    CBC Auto Differential    Comprehensive Metabolic Panel    Lipid Panel             Orders Placed This Encounter   Procedures    CBC Auto Differential     Standing Status:   Future     Expected Date:   1/9/2026     Expiration Date:   7/9/2026    Comprehensive Metabolic Panel     Standing Status:   Future     Expected Date:   1/9/2026     Expiration Date:   7/9/2026    Lipid Panel     Standing Status:   Future     Expected Date:   1/9/2026     Expiration Date:   7/9/2026        Medication List with Changes/Refills   New Medications    TIRZEPATIDE, WEIGHT LOSS, (ZEPBOUND) 2.5 MG/0.5 ML PNIJ    Inject 2.5 mg into the skin every 7 days.       Start Date: 7/9/2025  End Date: 8/6/2025    TIRZEPATIDE, WEIGHT LOSS, (ZEPBOUND) 5  MG/0.5 ML PNIJ    Inject 5 mg into the skin every 7 days.       Start Date: 8/7/2025  End Date: 9/4/2025    TIRZEPATIDE, WEIGHT LOSS, (ZEPBOUND) 7.5 MG/0.5 ML PNIJ    Inject 7.5 mg into the skin every 7 days.       Start Date: 9/5/2025  End Date: --   Current Medications    SILDENAFIL (VIAGRA) 100 MG TABLET    TAKE 1/2 TABLET BY MOUTH ONCE DAILY FOR FIRST 2 DOSES, THEN 1 TABLET ONCE DAILY AS NEEDED FOR ERECTILE DYSFUNCTION.       Start Date: 11/25/2024End Date: --    TESTOSTERONE CYPIONATE (DEPO-TESTOSTERONE) 200 MG/ML INJECTION    Inject 0.5 mLs (100 mg total) into the muscle every 14 (fourteen) days.       Start Date: 11/25/2024End Date: 10/27/2025            Follow up in about 6 months (around 1/9/2026). In addition to their scheduled follow up, the patient has also been instructed to follow up on as needed basis.       Jesus House

## 2025-08-21 DIAGNOSIS — R79.89 DECREASED TESTOSTERONE LEVEL: ICD-10-CM

## 2025-08-21 RX ORDER — TESTOSTERONE CYPIONATE 200 MG/ML
100 INJECTION, SOLUTION INTRAMUSCULAR
Qty: 3 ML | Refills: 3 | Status: SHIPPED | OUTPATIENT
Start: 2025-08-21 | End: 2026-07-23

## 2025-08-21 RX ORDER — TIRZEPATIDE 5 MG/.5ML
5 INJECTION, SOLUTION SUBCUTANEOUS
Qty: 2 ML | Refills: 0 | Status: CANCELLED | OUTPATIENT
Start: 2025-08-21 | End: 2025-09-18

## 2025-08-22 DIAGNOSIS — E66.9 OBESITY, UNSPECIFIED CLASS, UNSPECIFIED OBESITY TYPE, UNSPECIFIED WHETHER SERIOUS COMORBIDITY PRESENT: Primary | ICD-10-CM

## 2025-08-22 DIAGNOSIS — E66.9 OBESITY, UNSPECIFIED CLASS, UNSPECIFIED OBESITY TYPE, UNSPECIFIED WHETHER SERIOUS COMORBIDITY PRESENT: ICD-10-CM

## 2025-08-22 RX ORDER — TIRZEPATIDE 2.5 MG/.5ML
2.5 INJECTION, SOLUTION SUBCUTANEOUS
Qty: 2 ML | Refills: 0 | Status: SHIPPED | OUTPATIENT
Start: 2025-08-22

## 2025-08-22 RX ORDER — TIRZEPATIDE 2.5 MG/.5ML
2.5 INJECTION, SOLUTION SUBCUTANEOUS
Qty: 4 PEN | Refills: 0 | Status: SHIPPED | OUTPATIENT
Start: 2025-08-22 | End: 2025-08-22